# Patient Record
Sex: MALE | Race: BLACK OR AFRICAN AMERICAN | Employment: OTHER | URBAN - METROPOLITAN AREA
[De-identification: names, ages, dates, MRNs, and addresses within clinical notes are randomized per-mention and may not be internally consistent; named-entity substitution may affect disease eponyms.]

---

## 2019-12-06 ENCOUNTER — IP HISTORICAL/CONVERTED ENCOUNTER (OUTPATIENT)
Dept: OTHER | Age: 64
End: 2019-12-06

## 2020-01-01 ENCOUNTER — APPOINTMENT (OUTPATIENT)
Dept: GENERAL RADIOLOGY | Age: 65
DRG: 283 | End: 2020-01-01
Attending: HOSPITALIST
Payer: MEDICARE

## 2020-01-01 ENCOUNTER — APPOINTMENT (OUTPATIENT)
Dept: CT IMAGING | Age: 65
DRG: 283 | End: 2020-01-01
Attending: HOSPITALIST
Payer: MEDICARE

## 2020-01-01 ENCOUNTER — APPOINTMENT (OUTPATIENT)
Dept: NON INVASIVE DIAGNOSTICS | Age: 65
DRG: 283 | End: 2020-01-01
Attending: HOSPITALIST
Payer: MEDICARE

## 2020-01-01 ENCOUNTER — APPOINTMENT (OUTPATIENT)
Dept: GENERAL RADIOLOGY | Age: 65
DRG: 283 | End: 2020-01-01
Attending: STUDENT IN AN ORGANIZED HEALTH CARE EDUCATION/TRAINING PROGRAM
Payer: MEDICARE

## 2020-01-01 ENCOUNTER — APPOINTMENT (OUTPATIENT)
Dept: GENERAL RADIOLOGY | Age: 65
DRG: 283 | End: 2020-01-01
Attending: RADIOLOGY
Payer: MEDICARE

## 2020-01-01 ENCOUNTER — ANESTHESIA EVENT (OUTPATIENT)
Dept: CARDIAC CATH/INVASIVE PROCEDURES | Age: 65
DRG: 283 | End: 2020-01-01
Payer: MEDICARE

## 2020-01-01 ENCOUNTER — APPOINTMENT (OUTPATIENT)
Dept: ULTRASOUND IMAGING | Age: 65
DRG: 283 | End: 2020-01-01
Attending: SURGERY
Payer: MEDICARE

## 2020-01-01 ENCOUNTER — APPOINTMENT (OUTPATIENT)
Dept: INTERVENTIONAL RADIOLOGY/VASCULAR | Age: 65
DRG: 283 | End: 2020-01-01
Attending: INTERNAL MEDICINE
Payer: MEDICARE

## 2020-01-01 ENCOUNTER — ANESTHESIA (OUTPATIENT)
Dept: CARDIAC CATH/INVASIVE PROCEDURES | Age: 65
DRG: 283 | End: 2020-01-01
Payer: MEDICARE

## 2020-01-01 ENCOUNTER — HOSPITAL ENCOUNTER (INPATIENT)
Age: 65
LOS: 5 days | DRG: 283 | End: 2020-12-15
Attending: STUDENT IN AN ORGANIZED HEALTH CARE EDUCATION/TRAINING PROGRAM | Admitting: HOSPITALIST
Payer: MEDICARE

## 2020-01-01 VITALS
WEIGHT: 115.96 LBS | OXYGEN SATURATION: 96 % | DIASTOLIC BLOOD PRESSURE: 76 MMHG | SYSTOLIC BLOOD PRESSURE: 110 MMHG | RESPIRATION RATE: 22 BRPM | HEART RATE: 96 BPM | BODY MASS INDEX: 16.6 KG/M2 | TEMPERATURE: 98.2 F | HEIGHT: 70 IN

## 2020-01-01 DIAGNOSIS — N17.9 ACUTE RENAL FAILURE, UNSPECIFIED ACUTE RENAL FAILURE TYPE (HCC): ICD-10-CM

## 2020-01-01 DIAGNOSIS — I21.4 NSTEMI (NON-ST ELEVATED MYOCARDIAL INFARCTION) (HCC): Primary | ICD-10-CM

## 2020-01-01 DIAGNOSIS — R07.9 CHEST PAIN, UNSPECIFIED TYPE: ICD-10-CM

## 2020-01-01 LAB
ABO + RH BLD: NORMAL
ALBUMIN SERPL-MCNC: 1.7 G/DL (ref 3.5–5)
ALBUMIN SERPL-MCNC: 2 G/DL (ref 3.5–5)
ALBUMIN SERPL-MCNC: 2.2 G/DL (ref 3.5–5)
ALBUMIN SERPL-MCNC: 2.4 G/DL (ref 3.5–5)
ALBUMIN SERPL-MCNC: 2.6 G/DL (ref 3.5–5)
ALBUMIN/GLOB SERPL: 0.4 {RATIO} (ref 1.1–2.2)
ALBUMIN/GLOB SERPL: 0.4 {RATIO} (ref 1.1–2.2)
ALBUMIN/GLOB SERPL: 0.5 {RATIO} (ref 1.1–2.2)
ALBUMIN/GLOB SERPL: 0.5 {RATIO} (ref 1.1–2.2)
ALBUMIN/GLOB SERPL: 0.6 {RATIO} (ref 1.1–2.2)
ALBUMIN/GLOB SERPL: 0.6 {RATIO} (ref 1.1–2.2)
ALP SERPL-CCNC: 104 U/L (ref 45–117)
ALP SERPL-CCNC: 244 U/L (ref 45–117)
ALP SERPL-CCNC: 81 U/L (ref 45–117)
ALP SERPL-CCNC: 93 U/L (ref 45–117)
ALP SERPL-CCNC: 97 U/L (ref 45–117)
ALP SERPL-CCNC: 98 U/L (ref 45–117)
ALT SERPL-CCNC: 334 U/L (ref 12–78)
ALT SERPL-CCNC: 347 U/L (ref 12–78)
ALT SERPL-CCNC: 355 U/L (ref 12–78)
ALT SERPL-CCNC: 382 U/L (ref 12–78)
ALT SERPL-CCNC: 438 U/L (ref 12–78)
ALT SERPL-CCNC: 454 U/L (ref 12–78)
ANION GAP SERPL CALC-SCNC: 11 MMOL/L (ref 5–15)
ANION GAP SERPL CALC-SCNC: 12 MMOL/L (ref 5–15)
ANION GAP SERPL CALC-SCNC: 14 MMOL/L (ref 5–15)
ANION GAP SERPL CALC-SCNC: 14 MMOL/L (ref 5–15)
ANION GAP SERPL CALC-SCNC: 15 MMOL/L (ref 5–15)
ANION GAP SERPL CALC-SCNC: 16 MMOL/L (ref 5–15)
ANION GAP SERPL CALC-SCNC: 9 MMOL/L (ref 5–15)
APPEARANCE UR: ABNORMAL
APTT PPP: 112.6 SEC (ref 23–35.7)
APTT PPP: 144.6 SEC (ref 23–35.7)
APTT PPP: 40.6 SEC (ref 23–35.7)
APTT PPP: 43.8 SEC (ref 23–35.7)
APTT PPP: 45.3 SEC (ref 23–35.7)
APTT PPP: 71.1 SEC (ref 23–35.7)
APTT PPP: 87.4 SEC (ref 23–35.7)
APTT PPP: 94.1 SEC (ref 23–35.7)
APTT PPP: >153 SEC (ref 23–35.7)
ARTERIAL PATENCY WRIST A: POSITIVE
AST SERPL W P-5'-P-CCNC: 1159 U/L (ref 15–37)
AST SERPL W P-5'-P-CCNC: 1166 U/L (ref 15–37)
AST SERPL W P-5'-P-CCNC: 654 U/L (ref 15–37)
AST SERPL W P-5'-P-CCNC: 748 U/L (ref 15–37)
AST SERPL W P-5'-P-CCNC: 789 U/L (ref 15–37)
AST SERPL W P-5'-P-CCNC: 884 U/L (ref 15–37)
ATRIAL RATE: 102 BPM
ATRIAL RATE: 85 BPM
ATRIAL RATE: 89 BPM
ATRIAL RATE: 90 BPM
ATRIAL RATE: 95 BPM
ATRIAL RATE: 99 BPM
BACTERIA URNS QL MICRO: NEGATIVE /HPF
BASE DEFICIT BLDA-SCNC: 16.9 MMOL/L (ref 0–2)
BASOPHILS # BLD: 0 K/UL (ref 0–0.1)
BASOPHILS NFR BLD: 0 % (ref 0–1)
BDY SITE: ABNORMAL
BILIRUB SERPL-MCNC: 0.3 MG/DL (ref 0.2–1)
BILIRUB UR QL: NEGATIVE
BLD PROD TYP BPU: NORMAL
BLOOD GROUP ANTIBODIES SERPL: NEGATIVE
BNP SERPL-MCNC: ABNORMAL PG/ML
BPU ID: NORMAL
BUN SERPL-MCNC: 100 MG/DL (ref 6–20)
BUN SERPL-MCNC: 30 MG/DL (ref 6–20)
BUN SERPL-MCNC: 30 MG/DL (ref 6–20)
BUN SERPL-MCNC: 50 MG/DL (ref 6–20)
BUN SERPL-MCNC: 56 MG/DL (ref 6–20)
BUN SERPL-MCNC: 61 MG/DL (ref 6–20)
BUN SERPL-MCNC: 87 MG/DL (ref 6–20)
BUN/CREAT SERPL: 5 (ref 12–20)
BUN/CREAT SERPL: 5 (ref 12–20)
BUN/CREAT SERPL: 6 (ref 12–20)
BUN/CREAT SERPL: 7 (ref 12–20)
BUN/CREAT SERPL: 7 (ref 12–20)
CA-I BLD-MCNC: 7.9 MG/DL (ref 8.5–10.1)
CA-I BLD-MCNC: 8 MG/DL (ref 8.5–10.1)
CA-I BLD-MCNC: 8.2 MG/DL (ref 8.5–10.1)
CA-I BLD-MCNC: 8.2 MG/DL (ref 8.5–10.1)
CA-I BLD-MCNC: 8.4 MG/DL (ref 8.5–10.1)
CA-I BLD-MCNC: 8.6 MG/DL (ref 8.5–10.1)
CALCULATED P AXIS, ECG09: 57 DEGREES
CALCULATED P AXIS, ECG09: 67 DEGREES
CALCULATED P AXIS, ECG09: 71 DEGREES
CALCULATED P AXIS, ECG09: 75 DEGREES
CALCULATED P AXIS, ECG09: 79 DEGREES
CALCULATED R AXIS, ECG10: -101 DEGREES
CALCULATED R AXIS, ECG10: -105 DEGREES
CALCULATED R AXIS, ECG10: -49 DEGREES
CALCULATED R AXIS, ECG10: -50 DEGREES
CALCULATED R AXIS, ECG10: -85 DEGREES
CALCULATED R AXIS, ECG10: -88 DEGREES
CALCULATED T AXIS, ECG11: 64 DEGREES
CALCULATED T AXIS, ECG11: 68 DEGREES
CALCULATED T AXIS, ECG11: 78 DEGREES
CALCULATED T AXIS, ECG11: 81 DEGREES
CALCULATED T AXIS, ECG11: 87 DEGREES
CALCULATED T AXIS, ECG11: 89 DEGREES
CHLORIDE SERPL-SCNC: 103 MMOL/L (ref 97–108)
CHLORIDE SERPL-SCNC: 104 MMOL/L (ref 97–108)
CHLORIDE SERPL-SCNC: 106 MMOL/L (ref 97–108)
CHLORIDE SERPL-SCNC: 109 MMOL/L (ref 97–108)
CK SERPL-CCNC: NORMAL U/L (ref 39–308)
CO2 SERPL-SCNC: 11 MMOL/L (ref 21–32)
CO2 SERPL-SCNC: 17 MMOL/L (ref 21–32)
CO2 SERPL-SCNC: 20 MMOL/L (ref 21–32)
CO2 SERPL-SCNC: 22 MMOL/L (ref 21–32)
CO2 SERPL-SCNC: 23 MMOL/L (ref 21–32)
CO2 SERPL-SCNC: 24 MMOL/L (ref 21–32)
CO2 SERPL-SCNC: 25 MMOL/L (ref 21–32)
COLOR UR: ABNORMAL
COVID-19 RAPID TEST, COVR: NEGATIVE
CRD SYSTOLIC BP: 88
CREAT SERPL-MCNC: 12.9 MG/DL (ref 0.7–1.3)
CREAT SERPL-MCNC: 13.8 MG/DL (ref 0.7–1.3)
CREAT SERPL-MCNC: 5.82 MG/DL (ref 0.7–1.3)
CREAT SERPL-MCNC: 5.85 MG/DL (ref 0.7–1.3)
CREAT SERPL-MCNC: 8.57 MG/DL (ref 0.7–1.3)
CREAT SERPL-MCNC: 9.42 MG/DL (ref 0.7–1.3)
CREAT SERPL-MCNC: 9.88 MG/DL (ref 0.7–1.3)
CREAT UR-MCNC: 157 MG/DL
CROSSMATCH RESULT,%XM: NORMAL
DIAGNOSIS, 93000: NORMAL
DIFFERENTIAL METHOD BLD: ABNORMAL
ECHO AV AREA PEAK VELOCITY: 2.59 CM2
ECHO AV AREA/BSA PEAK VELOCITY: 1.6 CM2/M2
ECHO AV PEAK GRADIENT: 2 MMHG
ECHO EST RA PRESSURE: 3 MMHG
ECHO LV E' SEPTAL VELOCITY: 6.24 CM/S
ECHO LV EDV A2C: 49.8 CM3
ECHO LV EJECTION FRACTION A2C: 38 %
ECHO LV EJECTION FRACTION BIPLANE: 67.9 % (ref 55–100)
ECHO LV ESV A2C: 12.2 CM3
ECHO LV INTERNAL DIMENSION DIASTOLIC: 3.68 CM (ref 4.2–5.9)
ECHO LV INTERNAL DIMENSION SYSTOLIC: 2.3 CM
ECHO LV IVSD: 0.8 CM (ref 0.6–1)
ECHO LV MASS 2D: 85.8 G (ref 88–224)
ECHO LV MASS INDEX 2D: 51.8 G/M2 (ref 49–115)
ECHO LV POSTERIOR WALL DIASTOLIC: 0.86 CM (ref 0.6–1)
ECHO LVOT DIAM: 1.8 CM
ECHO LVOT PEAK GRADIENT: 2 MMHG
ECHO MV A VELOCITY: 64.2 CM/S
ECHO MV AREA PHT: 3.44 CM2
ECHO MV E DECELERATION TIME (DT): 169 MS
ECHO MV E VELOCITY: 42.6 CM/S
ECHO MV E/A RATIO: 0.66
ECHO MV E/E' SEPTAL: 6.83
ECHO MV PRESSURE HALF TIME (PHT): 64 MS
ECHO PV PEAK INSTANTANEOUS GRADIENT SYSTOLIC: 3 MMHG
ECHO PV PEAK INSTANTANEOUS GRADIENT SYSTOLIC: 9 MMHG
ECHO PV REGURGITANT MAX VELOCITY: 154 CM/S
ECHO PV REGURGITANT MAX VELOCITY: 541 CM/S
ECHO PV REGURGITANT MAX VELOCITY: 68.8 CM/S
ECHO PV REGURGITANT MAX VELOCITY: 70.1 CM/S
ECHO PV REGURGITANT MAX VELOCITY: 93.5 CM/S
ECHO PVEIN A DURATION: 81 MS
ECHO PVEIN A VELOCITY: 22.5 CM/S
ECHO RIGHT VENTRICULAR SYSTOLIC PRESSURE (RVSP): 33 MMHG
ECHO RV INTERNAL DIMENSION: 2.6 CM
ECHO TV MAX VELOCITY: 275 CM/S
ECHO TV REGURGITANT PEAK GRADIENT: 30 MMHG
EOSINOPHIL # BLD: 0 K/UL (ref 0–0.4)
EOSINOPHIL # BLD: 0.1 K/UL (ref 0–0.4)
EOSINOPHIL NFR BLD: 0 % (ref 0–7)
EOSINOPHIL NFR BLD: 1 % (ref 0–7)
ERYTHROCYTE [DISTWIDTH] IN BLOOD BY AUTOMATED COUNT: 13.4 % (ref 11.5–14.5)
ERYTHROCYTE [DISTWIDTH] IN BLOOD BY AUTOMATED COUNT: 13.6 % (ref 11.5–14.5)
ERYTHROCYTE [DISTWIDTH] IN BLOOD BY AUTOMATED COUNT: 14 % (ref 11.5–14.5)
ERYTHROCYTE [DISTWIDTH] IN BLOOD BY AUTOMATED COUNT: 14.2 % (ref 11.5–14.5)
ERYTHROCYTE [DISTWIDTH] IN BLOOD BY AUTOMATED COUNT: 14.4 % (ref 11.5–14.5)
ERYTHROCYTE [DISTWIDTH] IN BLOOD BY AUTOMATED COUNT: 14.7 % (ref 11.5–14.5)
ERYTHROCYTE [DISTWIDTH] IN BLOOD BY AUTOMATED COUNT: 15 % (ref 11.5–14.5)
ERYTHROCYTE [DISTWIDTH] IN BLOOD BY AUTOMATED COUNT: 15 % (ref 11.5–14.5)
FIO2 ON VENT: 100 %
GAS FLOW.O2 O2 DELIVERY SYS: 15 L/MIN
GLOBULIN SER CALC-MCNC: 3.6 G/DL (ref 2–4)
GLOBULIN SER CALC-MCNC: 3.7 G/DL (ref 2–4)
GLOBULIN SER CALC-MCNC: 3.8 G/DL (ref 2–4)
GLOBULIN SER CALC-MCNC: 3.8 G/DL (ref 2–4)
GLOBULIN SER CALC-MCNC: 4.5 G/DL (ref 2–4)
GLOBULIN SER CALC-MCNC: 5.2 G/DL (ref 2–4)
GLUCOSE BLD STRIP.AUTO-MCNC: 85 MG/DL (ref 65–100)
GLUCOSE SERPL-MCNC: 53 MG/DL (ref 65–100)
GLUCOSE SERPL-MCNC: 89 MG/DL (ref 65–100)
GLUCOSE SERPL-MCNC: 94 MG/DL (ref 65–100)
GLUCOSE SERPL-MCNC: 94 MG/DL (ref 65–100)
GLUCOSE SERPL-MCNC: 95 MG/DL (ref 65–100)
GLUCOSE SERPL-MCNC: 95 MG/DL (ref 65–100)
GLUCOSE SERPL-MCNC: 96 MG/DL (ref 65–100)
GLUCOSE UR STRIP.AUTO-MCNC: NEGATIVE MG/DL
HBV CORE AB SERPL QL IA: NEGATIVE
HBV SURFACE AB SER QL: NONREACTIVE
HBV SURFACE AB SER-ACNC: <3.1 MIU/ML
HBV SURFACE AG SER QL: <0.1 INDEX
HBV SURFACE AG SER QL: NEGATIVE
HCO3 BLDA-SCNC: 12 MMOL/L (ref 22–26)
HCT VFR BLD AUTO: 21.1 % (ref 36.6–50.3)
HCT VFR BLD AUTO: 23.2 % (ref 36.6–50.3)
HCT VFR BLD AUTO: 23.6 % (ref 36.6–50.3)
HCT VFR BLD AUTO: 24.8 % (ref 36.6–50.3)
HCT VFR BLD AUTO: 25 % (ref 36.6–50.3)
HCT VFR BLD AUTO: 25.6 % (ref 36.6–50.3)
HCT VFR BLD AUTO: 29.9 % (ref 36.6–50.3)
HCT VFR BLD AUTO: 34.3 % (ref 36.6–50.3)
HCT VFR BLD AUTO: 38.5 % (ref 36.6–50.3)
HGB BLD-MCNC: 10.2 G/DL (ref 12.1–17)
HGB BLD-MCNC: 11.2 G/DL (ref 12.1–17)
HGB BLD-MCNC: 12.7 G/DL (ref 12.1–17)
HGB BLD-MCNC: 7.3 G/DL (ref 12.1–17)
HGB BLD-MCNC: 7.7 G/DL (ref 12.1–17)
HGB BLD-MCNC: 8.1 G/DL (ref 12.1–17)
HGB BLD-MCNC: 8.5 G/DL (ref 12.1–17)
HGB BLD-MCNC: 8.5 G/DL (ref 12.1–17)
HGB BLD-MCNC: 8.7 G/DL (ref 12.1–17)
HGB UR QL STRIP: ABNORMAL
IMM GRANULOCYTES # BLD AUTO: 0 K/UL (ref 0–0.04)
IMM GRANULOCYTES # BLD AUTO: 0.1 K/UL (ref 0–0.04)
IMM GRANULOCYTES NFR BLD AUTO: 0 % (ref 0–0.5)
IMM GRANULOCYTES NFR BLD AUTO: 1 % (ref 0–0.5)
KETONES UR QL STRIP.AUTO: NEGATIVE MG/DL
LACTATE SERPL-SCNC: 0.8 MMOL/L (ref 0.4–2)
LACTATE SERPL-SCNC: 7.1 MMOL/L (ref 0.4–2)
LEUKOCYTE ESTERASE UR QL STRIP.AUTO: NEGATIVE
LIPASE SERPL-CCNC: 469 U/L (ref 73–393)
LYMPHOCYTES # BLD: 0.5 K/UL (ref 0.8–3.5)
LYMPHOCYTES # BLD: 0.7 K/UL (ref 0.8–3.5)
LYMPHOCYTES # BLD: 0.9 K/UL (ref 0.8–3.5)
LYMPHOCYTES # BLD: 0.9 K/UL (ref 0.8–3.5)
LYMPHOCYTES # BLD: 1.1 K/UL (ref 0.8–3.5)
LYMPHOCYTES # BLD: 1.2 K/UL (ref 0.8–3.5)
LYMPHOCYTES NFR BLD: 10 % (ref 12–49)
LYMPHOCYTES NFR BLD: 4 % (ref 12–49)
LYMPHOCYTES NFR BLD: 7 % (ref 12–49)
LYMPHOCYTES NFR BLD: 9 % (ref 12–49)
MAGNESIUM SERPL-MCNC: 2.5 MG/DL (ref 1.6–2.4)
MCH RBC QN AUTO: 32.1 PG (ref 26–34)
MCH RBC QN AUTO: 32.2 PG (ref 26–34)
MCH RBC QN AUTO: 32.3 PG (ref 26–34)
MCH RBC QN AUTO: 32.5 PG (ref 26–34)
MCH RBC QN AUTO: 32.6 PG (ref 26–34)
MCH RBC QN AUTO: 33 PG (ref 26–34)
MCH RBC QN AUTO: 33.5 PG (ref 26–34)
MCH RBC QN AUTO: 33.6 PG (ref 26–34)
MCHC RBC AUTO-ENTMCNC: 32.7 G/DL (ref 30–36.5)
MCHC RBC AUTO-ENTMCNC: 33 G/DL (ref 30–36.5)
MCHC RBC AUTO-ENTMCNC: 33.2 G/DL (ref 30–36.5)
MCHC RBC AUTO-ENTMCNC: 34 G/DL (ref 30–36.5)
MCHC RBC AUTO-ENTMCNC: 34 G/DL (ref 30–36.5)
MCHC RBC AUTO-ENTMCNC: 34.1 G/DL (ref 30–36.5)
MCHC RBC AUTO-ENTMCNC: 34.3 G/DL (ref 30–36.5)
MCHC RBC AUTO-ENTMCNC: 34.3 G/DL (ref 30–36.5)
MCV RBC AUTO: 101.2 FL (ref 80–99)
MCV RBC AUTO: 101.6 FL (ref 80–99)
MCV RBC AUTO: 94.7 FL (ref 80–99)
MCV RBC AUTO: 94.8 FL (ref 80–99)
MCV RBC AUTO: 95 FL (ref 80–99)
MCV RBC AUTO: 95.2 FL (ref 80–99)
MCV RBC AUTO: 96.7 FL (ref 80–99)
MCV RBC AUTO: 98.4 FL (ref 80–99)
MONOCYTES # BLD: 0.4 K/UL (ref 0–1)
MONOCYTES # BLD: 0.5 K/UL (ref 0–1)
MONOCYTES # BLD: 0.6 K/UL (ref 0–1)
MONOCYTES # BLD: 0.7 K/UL (ref 0–1)
MONOCYTES # BLD: 0.8 K/UL (ref 0–1)
MONOCYTES # BLD: 0.9 K/UL (ref 0–1)
MONOCYTES NFR BLD: 5 % (ref 5–13)
MONOCYTES NFR BLD: 6 % (ref 5–13)
MONOCYTES NFR BLD: 7 % (ref 5–13)
MONOCYTES NFR BLD: 7 % (ref 5–13)
MONOCYTES NFR BLD: 8 % (ref 5–13)
MRSA DNA SPEC QL NAA+PROBE: NOT DETECTED
MV DEC SLOPE: 2980 MM/S2
MV DEC SLOPE: 2980 MM/S2
NEUTS SEG # BLD: 10.2 K/UL (ref 1.8–8)
NEUTS SEG # BLD: 10.6 K/UL (ref 1.8–8)
NEUTS SEG # BLD: 10.8 K/UL (ref 1.8–8)
NEUTS SEG # BLD: 10.9 K/UL (ref 1.8–8)
NEUTS SEG # BLD: 11.5 K/UL (ref 1.8–8)
NEUTS SEG # BLD: 6.4 K/UL (ref 1.8–8)
NEUTS SEG # BLD: 7 K/UL (ref 1.8–8)
NEUTS SEG # BLD: 9.4 K/UL (ref 1.8–8)
NEUTS SEG NFR BLD: 81 % (ref 32–75)
NEUTS SEG NFR BLD: 83 % (ref 32–75)
NEUTS SEG NFR BLD: 84 % (ref 32–75)
NEUTS SEG NFR BLD: 85 % (ref 32–75)
NEUTS SEG NFR BLD: 87 % (ref 32–75)
NEUTS SEG NFR BLD: 89 % (ref 32–75)
NITRITE UR QL STRIP.AUTO: NEGATIVE
NRBC # BLD: 0 K/UL (ref 0–0.01)
NRBC # BLD: 0 K/UL (ref 0–0.01)
NRBC BLD-RTO: 0 PER 100 WBC
NRBC BLD-RTO: 0 PER 100 WBC
P-R INTERVAL, ECG05: 140 MS
P-R INTERVAL, ECG05: 144 MS
P-R INTERVAL, ECG05: 146 MS
P-R INTERVAL, ECG05: 148 MS
P-R INTERVAL, ECG05: 196 MS
P-R INTERVAL, ECG05: 210 MS
PCO2 BLDA: 20 MMHG (ref 35–45)
PERFORMED BY, TECHID: NORMAL
PH BLDA: 7.25 [PH] (ref 7.35–7.45)
PH UR STRIP: 6 [PH] (ref 5–8)
PHOSPHATE SERPL-MCNC: 3.4 MG/DL (ref 2.6–4.7)
PHOSPHATE SERPL-MCNC: 9.1 MG/DL (ref 2.6–4.7)
PLATELET # BLD AUTO: 193 K/UL (ref 150–400)
PLATELET # BLD AUTO: 203 K/UL (ref 150–400)
PLATELET # BLD AUTO: 207 K/UL (ref 150–400)
PLATELET # BLD AUTO: 211 K/UL (ref 150–400)
PLATELET # BLD AUTO: 261 K/UL (ref 150–400)
PLATELET # BLD AUTO: 330 K/UL (ref 150–400)
PLATELET # BLD AUTO: 332 K/UL (ref 150–400)
PLATELET # BLD AUTO: 354 K/UL (ref 150–400)
PMV BLD AUTO: 10 FL (ref 8.9–12.9)
PMV BLD AUTO: 10.1 FL (ref 8.9–12.9)
PMV BLD AUTO: 10.3 FL (ref 8.9–12.9)
PMV BLD AUTO: 9.5 FL (ref 8.9–12.9)
PMV BLD AUTO: 9.7 FL (ref 8.9–12.9)
PMV BLD AUTO: 9.8 FL (ref 8.9–12.9)
PMV BLD AUTO: 9.8 FL (ref 8.9–12.9)
PMV BLD AUTO: 9.9 FL (ref 8.9–12.9)
PO2 BLDA: 271 MMHG (ref 75–100)
POTASSIUM SERPL-SCNC: 3.2 MMOL/L (ref 3.5–5.1)
POTASSIUM SERPL-SCNC: 3.3 MMOL/L (ref 3.5–5.1)
POTASSIUM SERPL-SCNC: 3.3 MMOL/L (ref 3.5–5.1)
POTASSIUM SERPL-SCNC: 3.6 MMOL/L (ref 3.5–5.1)
POTASSIUM SERPL-SCNC: 3.6 MMOL/L (ref 3.5–5.1)
POTASSIUM SERPL-SCNC: 4.8 MMOL/L (ref 3.5–5.1)
POTASSIUM SERPL-SCNC: 6.3 MMOL/L (ref 3.5–5.1)
POTASSIUM SERPL-SCNC: 6.4 MMOL/L (ref 3.5–5.1)
PROT SERPL-MCNC: 5.5 G/DL (ref 6.4–8.2)
PROT SERPL-MCNC: 5.5 G/DL (ref 6.4–8.2)
PROT SERPL-MCNC: 5.6 G/DL (ref 6.4–8.2)
PROT SERPL-MCNC: 5.9 G/DL (ref 6.4–8.2)
PROT SERPL-MCNC: 6.9 G/DL (ref 6.4–8.2)
PROT SERPL-MCNC: 7.8 G/DL (ref 6.4–8.2)
PROT UR STRIP-MCNC: 100 MG/DL
PTH-INTACT SERPL-MCNC: 365.8 PG/ML (ref 18.4–88)
Q-T INTERVAL, ECG07: 386 MS
Q-T INTERVAL, ECG07: 408 MS
Q-T INTERVAL, ECG07: 414 MS
Q-T INTERVAL, ECG07: 416 MS
Q-T INTERVAL, ECG07: 422 MS
Q-T INTERVAL, ECG07: 422 MS
QRS DURATION, ECG06: 122 MS
QRS DURATION, ECG06: 128 MS
QRS DURATION, ECG06: 128 MS
QRS DURATION, ECG06: 132 MS
QRS DURATION, ECG06: 132 MS
QRS DURATION, ECG06: 170 MS
QTC CALCULATION (BEZET), ECG08: 495 MS
QTC CALCULATION (BEZET), ECG08: 502 MS
QTC CALCULATION (BEZET), ECG08: 508 MS
QTC CALCULATION (BEZET), ECG08: 513 MS
QTC CALCULATION (BEZET), ECG08: 520 MS
QTC CALCULATION (BEZET), ECG08: 531 MS
RBC # BLD AUTO: 2.4 M/UL (ref 4.1–5.7)
RBC # BLD AUTO: 2.49 M/UL (ref 4.1–5.7)
RBC # BLD AUTO: 2.61 M/UL (ref 4.1–5.7)
RBC # BLD AUTO: 2.64 M/UL (ref 4.1–5.7)
RBC # BLD AUTO: 2.69 M/UL (ref 4.1–5.7)
RBC # BLD AUTO: 3.04 M/UL (ref 4.1–5.7)
RBC # BLD AUTO: 3.39 M/UL (ref 4.1–5.7)
RBC # BLD AUTO: 3.79 M/UL (ref 4.1–5.7)
RBC #/AREA URNS HPF: ABNORMAL /HPF (ref 0–5)
SAO2 % BLD: 100 %
SAO2% DEVICE SAO2% SENSOR NAME: ABNORMAL
SARS-COV-2, COV2: NORMAL
SERVICE CMNT-IMP: ABNORMAL
SODIUM SERPL-SCNC: 136 MMOL/L (ref 136–145)
SODIUM SERPL-SCNC: 137 MMOL/L (ref 136–145)
SODIUM SERPL-SCNC: 138 MMOL/L (ref 136–145)
SODIUM SERPL-SCNC: 139 MMOL/L (ref 136–145)
SODIUM UR-SCNC: 56 MMOL/L
SP GR UR REFRACTOMETRY: 1.02 (ref 1–1.03)
SPECIMEN EXP DATE BLD: NORMAL
SPECIMEN SITE: ABNORMAL
SPECIMEN SOURCE: ABNORMAL
STATUS OF UNIT,%ST: NORMAL
THERAPEUTIC RANGE,PTTT: ABNORMAL SEC (ref 68–109)
TRANSFUSION STATUS PATIENT QL: NORMAL
TROPONIN I SERPL-MCNC: 2.89 NG/ML
TROPONIN I SERPL-MCNC: 2.96 NG/ML
TROPONIN I SERPL-MCNC: 20.2 NG/ML
TROPONIN I SERPL-MCNC: 25.3 NG/ML
TROPONIN I SERPL-MCNC: 59.6 NG/ML
TROPONIN I SERPL-MCNC: 72.4 NG/ML
UNIT DIVISION, %UDIV: 0
URATE SERPL-MCNC: 6.6 MG/DL (ref 3.5–7.2)
UROBILINOGEN UR QL STRIP.AUTO: 0.1 EU/DL (ref 0.1–1)
VENTRICULAR RATE, ECG03: 102 BPM
VENTRICULAR RATE, ECG03: 85 BPM
VENTRICULAR RATE, ECG03: 89 BPM
VENTRICULAR RATE, ECG03: 90 BPM
VENTRICULAR RATE, ECG03: 95 BPM
VENTRICULAR RATE, ECG03: 99 BPM
WBC # BLD AUTO: 11.3 K/UL (ref 4.1–11.1)
WBC # BLD AUTO: 12.1 K/UL (ref 4.1–11.1)
WBC # BLD AUTO: 12.2 K/UL (ref 4.1–11.1)
WBC # BLD AUTO: 12.5 K/UL (ref 4.1–11.1)
WBC # BLD AUTO: 12.8 K/UL (ref 4.1–11.1)
WBC # BLD AUTO: 13.2 K/UL (ref 4.1–11.1)
WBC # BLD AUTO: 7.5 K/UL (ref 4.1–11.1)
WBC # BLD AUTO: 8.4 K/UL (ref 4.1–11.1)
WBC URNS QL MICRO: ABNORMAL /HPF (ref 0–4)

## 2020-01-01 PROCEDURE — 80053 COMPREHEN METABOLIC PANEL: CPT

## 2020-01-01 PROCEDURE — 85025 COMPLETE CBC W/AUTO DIFF WBC: CPT

## 2020-01-01 PROCEDURE — 99285 EMERGENCY DEPT VISIT HI MDM: CPT

## 2020-01-01 PROCEDURE — 74011250637 HC RX REV CODE- 250/637: Performed by: INTERNAL MEDICINE

## 2020-01-01 PROCEDURE — 36415 COLL VENOUS BLD VENIPUNCTURE: CPT

## 2020-01-01 PROCEDURE — 65610000006 HC RM INTENSIVE CARE

## 2020-01-01 PROCEDURE — 84484 ASSAY OF TROPONIN QUANT: CPT

## 2020-01-01 PROCEDURE — 74011000250 HC RX REV CODE- 250: Performed by: NURSE ANESTHETIST, CERTIFIED REGISTERED

## 2020-01-01 PROCEDURE — 74011250636 HC RX REV CODE- 250/636: Performed by: HOSPITALIST

## 2020-01-01 PROCEDURE — 92950 HEART/LUNG RESUSCITATION CPR: CPT | Performed by: INTERNAL MEDICINE

## 2020-01-01 PROCEDURE — 82962 GLUCOSE BLOOD TEST: CPT

## 2020-01-01 PROCEDURE — C1769 GUIDE WIRE: HCPCS | Performed by: INTERNAL MEDICINE

## 2020-01-01 PROCEDURE — 76700 US EXAM ABDOM COMPLETE: CPT

## 2020-01-01 PROCEDURE — 5A1D70Z PERFORMANCE OF URINARY FILTRATION, INTERMITTENT, LESS THAN 6 HOURS PER DAY: ICD-10-PCS | Performed by: INTERNAL MEDICINE

## 2020-01-01 PROCEDURE — 93005 ELECTROCARDIOGRAM TRACING: CPT

## 2020-01-01 PROCEDURE — 83605 ASSAY OF LACTIC ACID: CPT

## 2020-01-01 PROCEDURE — 87340 HEPATITIS B SURFACE AG IA: CPT

## 2020-01-01 PROCEDURE — 74011250636 HC RX REV CODE- 250/636: Performed by: NURSE ANESTHETIST, CERTIFIED REGISTERED

## 2020-01-01 PROCEDURE — 74011250637 HC RX REV CODE- 250/637: Performed by: STUDENT IN AN ORGANIZED HEALTH CARE EDUCATION/TRAINING PROGRAM

## 2020-01-01 PROCEDURE — 77030008814 HC VLV ACC PLUS MRTM -B: Performed by: INTERNAL MEDICINE

## 2020-01-01 PROCEDURE — 93306 TTE W/DOPPLER COMPLETE: CPT

## 2020-01-01 PROCEDURE — B2151ZZ FLUOROSCOPY OF LEFT HEART USING LOW OSMOLAR CONTRAST: ICD-10-PCS | Performed by: INTERNAL MEDICINE

## 2020-01-01 PROCEDURE — 82550 ASSAY OF CK (CPK): CPT

## 2020-01-01 PROCEDURE — 86923 COMPATIBILITY TEST ELECTRIC: CPT

## 2020-01-01 PROCEDURE — 74011250636 HC RX REV CODE- 250/636: Performed by: STUDENT IN AN ORGANIZED HEALTH CARE EDUCATION/TRAINING PROGRAM

## 2020-01-01 PROCEDURE — 83880 ASSAY OF NATRIURETIC PEPTIDE: CPT

## 2020-01-01 PROCEDURE — 71045 X-RAY EXAM CHEST 1 VIEW: CPT

## 2020-01-01 PROCEDURE — 84100 ASSAY OF PHOSPHORUS: CPT

## 2020-01-01 PROCEDURE — 30233N1 TRANSFUSION OF NONAUTOLOGOUS RED BLOOD CELLS INTO PERIPHERAL VEIN, PERCUTANEOUS APPROACH: ICD-10-PCS | Performed by: HOSPITALIST

## 2020-01-01 PROCEDURE — 74011250637 HC RX REV CODE- 250/637: Performed by: HOSPITALIST

## 2020-01-01 PROCEDURE — 87635 SARS-COV-2 COVID-19 AMP PRB: CPT

## 2020-01-01 PROCEDURE — 80069 RENAL FUNCTION PANEL: CPT

## 2020-01-01 PROCEDURE — C1769 GUIDE WIRE: HCPCS

## 2020-01-01 PROCEDURE — C1887 CATHETER, GUIDING: HCPCS | Performed by: INTERNAL MEDICINE

## 2020-01-01 PROCEDURE — 85347 COAGULATION TIME ACTIVATED: CPT

## 2020-01-01 PROCEDURE — 99222 1ST HOSP IP/OBS MODERATE 55: CPT | Performed by: INTERNAL MEDICINE

## 2020-01-01 PROCEDURE — 85730 THROMBOPLASTIN TIME PARTIAL: CPT

## 2020-01-01 PROCEDURE — 93458 L HRT ARTERY/VENTRICLE ANGIO: CPT | Performed by: INTERNAL MEDICINE

## 2020-01-01 PROCEDURE — B2111ZZ FLUOROSCOPY OF MULTIPLE CORONARY ARTERIES USING LOW OSMOLAR CONTRAST: ICD-10-PCS | Performed by: INTERNAL MEDICINE

## 2020-01-01 PROCEDURE — 74011000636 HC RX REV CODE- 636: Performed by: INTERNAL MEDICINE

## 2020-01-01 PROCEDURE — 74011250636 HC RX REV CODE- 250/636: Performed by: INTERNAL MEDICINE

## 2020-01-01 PROCEDURE — 81001 URINALYSIS AUTO W/SCOPE: CPT

## 2020-01-01 PROCEDURE — 99152 MOD SED SAME PHYS/QHP 5/>YRS: CPT | Performed by: INTERNAL MEDICINE

## 2020-01-01 PROCEDURE — 84132 ASSAY OF SERUM POTASSIUM: CPT

## 2020-01-01 PROCEDURE — 74011000258 HC RX REV CODE- 258: Performed by: HOSPITALIST

## 2020-01-01 PROCEDURE — 99153 MOD SED SAME PHYS/QHP EA: CPT | Performed by: INTERNAL MEDICINE

## 2020-01-01 PROCEDURE — 86706 HEP B SURFACE ANTIBODY: CPT

## 2020-01-01 PROCEDURE — 36600 WITHDRAWAL OF ARTERIAL BLOOD: CPT

## 2020-01-01 PROCEDURE — 74011636637 HC RX REV CODE- 636/637: Performed by: STUDENT IN AN ORGANIZED HEALTH CARE EDUCATION/TRAINING PROGRAM

## 2020-01-01 PROCEDURE — 5A1935Z RESPIRATORY VENTILATION, LESS THAN 24 CONSECUTIVE HOURS: ICD-10-PCS | Performed by: NURSE ANESTHETIST, CERTIFIED REGISTERED

## 2020-01-01 PROCEDURE — 77030013516 HC DEV INFL ANGI MRTM -B: Performed by: INTERNAL MEDICINE

## 2020-01-01 PROCEDURE — 87040 BLOOD CULTURE FOR BACTERIA: CPT

## 2020-01-01 PROCEDURE — P9047 ALBUMIN (HUMAN), 25%, 50ML: HCPCS | Performed by: INTERNAL MEDICINE

## 2020-01-01 PROCEDURE — 80307 DRUG TEST PRSMV CHEM ANLYZR: CPT

## 2020-01-01 PROCEDURE — 87641 MR-STAPH DNA AMP PROBE: CPT

## 2020-01-01 PROCEDURE — 93010 ELECTROCARDIOGRAM REPORT: CPT | Performed by: INTERNAL MEDICINE

## 2020-01-01 PROCEDURE — 77030004550 HC CATH ANGI DX PRF MRTM -B: Performed by: INTERNAL MEDICINE

## 2020-01-01 PROCEDURE — 83735 ASSAY OF MAGNESIUM: CPT

## 2020-01-01 PROCEDURE — 96366 THER/PROPH/DIAG IV INF ADDON: CPT

## 2020-01-01 PROCEDURE — 86704 HEP B CORE ANTIBODY TOTAL: CPT

## 2020-01-01 PROCEDURE — 85018 HEMOGLOBIN: CPT

## 2020-01-01 PROCEDURE — 96375 TX/PRO/DX INJ NEW DRUG ADDON: CPT

## 2020-01-01 PROCEDURE — 96365 THER/PROPH/DIAG IV INF INIT: CPT

## 2020-01-01 PROCEDURE — 0BH17EZ INSERTION OF ENDOTRACHEAL AIRWAY INTO TRACHEA, VIA NATURAL OR ARTIFICIAL OPENING: ICD-10-PCS | Performed by: NURSE ANESTHETIST, CERTIFIED REGISTERED

## 2020-01-01 PROCEDURE — 74011000258 HC RX REV CODE- 258: Performed by: INTERNAL MEDICINE

## 2020-01-01 PROCEDURE — C1894 INTRO/SHEATH, NON-LASER: HCPCS | Performed by: INTERNAL MEDICINE

## 2020-01-01 PROCEDURE — 02H633Z INSERTION OF INFUSION DEVICE INTO RIGHT ATRIUM, PERCUTANEOUS APPROACH: ICD-10-PCS | Performed by: RADIOLOGY

## 2020-01-01 PROCEDURE — 76937 US GUIDE VASCULAR ACCESS: CPT

## 2020-01-01 PROCEDURE — 90935 HEMODIALYSIS ONE EVALUATION: CPT

## 2020-01-01 PROCEDURE — 83690 ASSAY OF LIPASE: CPT

## 2020-01-01 PROCEDURE — 4A023N7 MEASUREMENT OF CARDIAC SAMPLING AND PRESSURE, LEFT HEART, PERCUTANEOUS APPROACH: ICD-10-PCS | Performed by: INTERNAL MEDICINE

## 2020-01-01 PROCEDURE — P9016 RBC LEUKOCYTES REDUCED: HCPCS

## 2020-01-01 PROCEDURE — 77030025703 HC SYR ANGI VACLOK MRTM -A: Performed by: INTERNAL MEDICINE

## 2020-01-01 PROCEDURE — 36430 TRANSFUSION BLD/BLD COMPNT: CPT

## 2020-01-01 PROCEDURE — 83970 ASSAY OF PARATHORMONE: CPT

## 2020-01-01 PROCEDURE — 86900 BLOOD TYPING SEROLOGIC ABO: CPT

## 2020-01-01 PROCEDURE — 85014 HEMATOCRIT: CPT

## 2020-01-01 PROCEDURE — 82570 ASSAY OF URINE CREATININE: CPT

## 2020-01-01 PROCEDURE — 82803 BLOOD GASES ANY COMBINATION: CPT

## 2020-01-01 PROCEDURE — 84550 ASSAY OF BLOOD/URIC ACID: CPT

## 2020-01-01 PROCEDURE — 74011000250 HC RX REV CODE- 250: Performed by: INTERNAL MEDICINE

## 2020-01-01 PROCEDURE — 84300 ASSAY OF URINE SODIUM: CPT

## 2020-01-01 PROCEDURE — 77030008542 HC TBNG MON PRSS EDWD -A: Performed by: INTERNAL MEDICINE

## 2020-01-01 PROCEDURE — 74011000250 HC RX REV CODE- 250: Performed by: STUDENT IN AN ORGANIZED HEALTH CARE EDUCATION/TRAINING PROGRAM

## 2020-01-01 PROCEDURE — 74176 CT ABD & PELVIS W/O CONTRAST: CPT

## 2020-01-01 RX ORDER — METOPROLOL TARTRATE 25 MG/1
12.5 TABLET, FILM COATED ORAL 2 TIMES DAILY
COMMUNITY

## 2020-01-01 RX ORDER — MAG HYDROX/ALUMINUM HYD/SIMETH 200-200-20
30 SUSPENSION, ORAL (FINAL DOSE FORM) ORAL ONCE
Status: DISCONTINUED | OUTPATIENT
Start: 2020-01-01 | End: 2020-01-01

## 2020-01-01 RX ORDER — DEXTROSE 50 % IN WATER (D50W) INTRAVENOUS SYRINGE
25
Status: COMPLETED | OUTPATIENT
Start: 2020-01-01 | End: 2020-01-01

## 2020-01-01 RX ORDER — ACETAMINOPHEN 500 MG
TABLET ORAL
COMMUNITY

## 2020-01-01 RX ORDER — SODIUM CHLORIDE 9 MG/ML
250 INJECTION, SOLUTION INTRAVENOUS AS NEEDED
Status: DISCONTINUED | OUTPATIENT
Start: 2020-01-01 | End: 2020-01-01 | Stop reason: HOSPADM

## 2020-01-01 RX ORDER — OMEPRAZOLE 20 MG/1
20 CAPSULE, DELAYED RELEASE ORAL DAILY
COMMUNITY

## 2020-01-01 RX ORDER — SODIUM BICARBONATE 650 MG/1
1300 TABLET ORAL 3 TIMES DAILY
Status: DISCONTINUED | OUTPATIENT
Start: 2020-01-01 | End: 2020-01-01

## 2020-01-01 RX ORDER — LIDOCAINE HYDROCHLORIDE 20 MG/ML
15 SOLUTION OROPHARYNGEAL
Status: DISCONTINUED | OUTPATIENT
Start: 2020-01-01 | End: 2020-01-01

## 2020-01-01 RX ORDER — SODIUM CHLORIDE 9 MG/ML
50 INJECTION, SOLUTION INTRAVENOUS CONTINUOUS
Status: DISCONTINUED | OUTPATIENT
Start: 2020-01-01 | End: 2020-01-01 | Stop reason: HOSPADM

## 2020-01-01 RX ORDER — LIDOCAINE HYDROCHLORIDE 10 MG/ML
INJECTION INFILTRATION; PERINEURAL AS NEEDED
Status: DISCONTINUED | OUTPATIENT
Start: 2020-01-01 | End: 2020-01-01 | Stop reason: HOSPADM

## 2020-01-01 RX ORDER — ASPIRIN 81 MG/1
81 TABLET ORAL DAILY
Status: DISCONTINUED | OUTPATIENT
Start: 2020-01-01 | End: 2020-01-01 | Stop reason: HOSPADM

## 2020-01-01 RX ORDER — IBUPROFEN 200 MG
1 TABLET ORAL DAILY
Status: DISCONTINUED | OUTPATIENT
Start: 2020-01-01 | End: 2020-01-01 | Stop reason: HOSPADM

## 2020-01-01 RX ORDER — ASPIRIN 81 MG/1
TABLET ORAL DAILY
COMMUNITY

## 2020-01-01 RX ORDER — HEPARIN SODIUM 1000 [USP'U]/ML
2500 INJECTION, SOLUTION INTRAVENOUS; SUBCUTANEOUS AS NEEDED
Status: DISCONTINUED | OUTPATIENT
Start: 2020-01-01 | End: 2020-01-01 | Stop reason: HOSPADM

## 2020-01-01 RX ORDER — HEPARIN SODIUM 1000 [USP'U]/ML
INJECTION, SOLUTION INTRAVENOUS; SUBCUTANEOUS AS NEEDED
Status: DISCONTINUED | OUTPATIENT
Start: 2020-01-01 | End: 2020-01-01 | Stop reason: HOSPADM

## 2020-01-01 RX ORDER — POTASSIUM CHLORIDE 20 MEQ/1
40 TABLET, EXTENDED RELEASE ORAL
Status: COMPLETED | OUTPATIENT
Start: 2020-01-01 | End: 2020-01-01

## 2020-01-01 RX ORDER — LOSARTAN POTASSIUM 25 MG/1
TABLET ORAL DAILY
COMMUNITY

## 2020-01-01 RX ORDER — HEPARIN SODIUM 200 [USP'U]/100ML
INJECTION, SOLUTION INTRAVENOUS
Status: COMPLETED | OUTPATIENT
Start: 2020-01-01 | End: 2020-01-01

## 2020-01-01 RX ORDER — ISOSORBIDE MONONITRATE 30 MG/1
TABLET, EXTENDED RELEASE ORAL DAILY
COMMUNITY

## 2020-01-01 RX ORDER — LORATADINE 10 MG/1
10 TABLET ORAL
COMMUNITY

## 2020-01-01 RX ORDER — VASOPRESSIN 20 U/ML
INJECTION PARENTERAL AS NEEDED
Status: DISCONTINUED | OUTPATIENT
Start: 2020-01-01 | End: 2020-01-01 | Stop reason: HOSPADM

## 2020-01-01 RX ORDER — SODIUM CHLORIDE 0.9 % (FLUSH) 0.9 %
5-40 SYRINGE (ML) INJECTION AS NEEDED
Status: DISCONTINUED | OUTPATIENT
Start: 2020-01-01 | End: 2020-01-01 | Stop reason: HOSPADM

## 2020-01-01 RX ORDER — SODIUM BICARBONATE 1 MEQ/ML
50 SYRINGE (ML) INTRAVENOUS ONCE
Status: COMPLETED | OUTPATIENT
Start: 2020-01-01 | End: 2020-01-01

## 2020-01-01 RX ORDER — SEVELAMER CARBONATE 800 MG/1
1600 TABLET, FILM COATED ORAL
Status: DISCONTINUED | OUTPATIENT
Start: 2020-01-01 | End: 2020-01-01 | Stop reason: HOSPADM

## 2020-01-01 RX ORDER — SODIUM POLYSTYRENE SULFONATE 15 G/60ML
15 SUSPENSION ORAL; RECTAL
Status: COMPLETED | OUTPATIENT
Start: 2020-01-01 | End: 2020-01-01

## 2020-01-01 RX ORDER — SODIUM CHLORIDE 0.9 % (FLUSH) 0.9 %
5-40 SYRINGE (ML) INJECTION EVERY 8 HOURS
Status: DISCONTINUED | OUTPATIENT
Start: 2020-01-01 | End: 2020-01-01 | Stop reason: HOSPADM

## 2020-01-01 RX ORDER — MIDAZOLAM HYDROCHLORIDE 1 MG/ML
INJECTION, SOLUTION INTRAMUSCULAR; INTRAVENOUS AS NEEDED
Status: DISCONTINUED | OUTPATIENT
Start: 2020-01-01 | End: 2020-01-01 | Stop reason: HOSPADM

## 2020-01-01 RX ORDER — ALBUMIN HUMAN 250 G/1000ML
25 SOLUTION INTRAVENOUS ONCE
Status: COMPLETED | OUTPATIENT
Start: 2020-01-01 | End: 2020-01-01

## 2020-01-01 RX ORDER — HEPARIN SODIUM 5000 [USP'U]/ML
60 INJECTION, SOLUTION INTRAVENOUS; SUBCUTANEOUS ONCE
Status: COMPLETED | OUTPATIENT
Start: 2020-01-01 | End: 2020-01-01

## 2020-01-01 RX ORDER — ONDANSETRON 2 MG/ML
4 INJECTION INTRAMUSCULAR; INTRAVENOUS
Status: DISCONTINUED | OUTPATIENT
Start: 2020-01-01 | End: 2020-01-01 | Stop reason: HOSPADM

## 2020-01-01 RX ORDER — ATORVASTATIN CALCIUM 40 MG/1
40 TABLET, FILM COATED ORAL
Status: DISCONTINUED | OUTPATIENT
Start: 2020-01-01 | End: 2020-01-01 | Stop reason: HOSPADM

## 2020-01-01 RX ORDER — ALBUTEROL SULFATE 2.5 MG/.5ML
1.25 SOLUTION RESPIRATORY (INHALATION)
Status: DISCONTINUED | OUTPATIENT
Start: 2020-01-01 | End: 2020-01-01 | Stop reason: HOSPADM

## 2020-01-01 RX ORDER — DIPHENHYDRAMINE HYDROCHLORIDE 50 MG/ML
INJECTION, SOLUTION INTRAMUSCULAR; INTRAVENOUS AS NEEDED
Status: DISCONTINUED | OUTPATIENT
Start: 2020-01-01 | End: 2020-01-01 | Stop reason: HOSPADM

## 2020-01-01 RX ORDER — ASPIRIN 325 MG/1
100 TABLET, FILM COATED ORAL DAILY
Status: DISCONTINUED | OUTPATIENT
Start: 2020-01-01 | End: 2020-01-01 | Stop reason: HOSPADM

## 2020-01-01 RX ORDER — ETOMIDATE 2 MG/ML
INJECTION INTRAVENOUS AS NEEDED
Status: DISCONTINUED | OUTPATIENT
Start: 2020-01-01 | End: 2020-01-01 | Stop reason: HOSPADM

## 2020-01-01 RX ORDER — ACETAMINOPHEN 650 MG/1
650 SUPPOSITORY RECTAL
Status: DISCONTINUED | OUTPATIENT
Start: 2020-01-01 | End: 2020-01-01

## 2020-01-01 RX ORDER — ROSUVASTATIN CALCIUM 40 MG/1
40 TABLET, COATED ORAL
COMMUNITY

## 2020-01-01 RX ORDER — HEPARIN SODIUM 1000 [USP'U]/ML
30 INJECTION, SOLUTION INTRAVENOUS; SUBCUTANEOUS AS NEEDED
Status: DISCONTINUED | OUTPATIENT
Start: 2020-01-01 | End: 2020-01-01 | Stop reason: HOSPADM

## 2020-01-01 RX ORDER — METOPROLOL TARTRATE 25 MG/1
25 TABLET, FILM COATED ORAL 2 TIMES DAILY
Status: DISCONTINUED | OUTPATIENT
Start: 2020-01-01 | End: 2020-01-01 | Stop reason: HOSPADM

## 2020-01-01 RX ORDER — CLOPIDOGREL 300 MG/1
300 TABLET, FILM COATED ORAL ONCE
Status: COMPLETED | OUTPATIENT
Start: 2020-01-01 | End: 2020-01-01

## 2020-01-01 RX ORDER — DOPAMINE HYDROCHLORIDE 160 MG/100ML
INJECTION, SOLUTION INTRAVENOUS
Status: COMPLETED | OUTPATIENT
Start: 2020-01-01 | End: 2020-01-01

## 2020-01-01 RX ORDER — NITROGLYCERIN 0.4 MG/1
0.4 TABLET SUBLINGUAL
COMMUNITY

## 2020-01-01 RX ORDER — ACETAMINOPHEN 325 MG/1
650 TABLET ORAL
Status: DISCONTINUED | OUTPATIENT
Start: 2020-01-01 | End: 2020-01-01

## 2020-01-01 RX ORDER — POLYETHYLENE GLYCOL 3350 17 G/17G
17 POWDER, FOR SOLUTION ORAL DAILY PRN
Status: DISCONTINUED | OUTPATIENT
Start: 2020-01-01 | End: 2020-01-01 | Stop reason: HOSPADM

## 2020-01-01 RX ORDER — GUAIFENESIN 100 MG/5ML
325 LIQUID (ML) ORAL
Status: COMPLETED | OUTPATIENT
Start: 2020-01-01 | End: 2020-01-01

## 2020-01-01 RX ORDER — CLOPIDOGREL BISULFATE 75 MG/1
TABLET ORAL
COMMUNITY

## 2020-01-01 RX ORDER — FOLIC ACID 1 MG/1
1 TABLET ORAL DAILY
Status: DISCONTINUED | OUTPATIENT
Start: 2020-01-01 | End: 2020-01-01 | Stop reason: HOSPADM

## 2020-01-01 RX ORDER — SUCCINYLCHOLINE CHLORIDE 20 MG/ML
INJECTION INTRAMUSCULAR; INTRAVENOUS AS NEEDED
Status: DISCONTINUED | OUTPATIENT
Start: 2020-01-01 | End: 2020-01-01 | Stop reason: HOSPADM

## 2020-01-01 RX ORDER — NITROGLYCERIN 0.4 MG/1
0.4 TABLET SUBLINGUAL AS NEEDED
Status: DISCONTINUED | OUTPATIENT
Start: 2020-01-01 | End: 2020-01-01 | Stop reason: HOSPADM

## 2020-01-01 RX ORDER — FENTANYL CITRATE 50 UG/ML
INJECTION, SOLUTION INTRAMUSCULAR; INTRAVENOUS AS NEEDED
Status: DISCONTINUED | OUTPATIENT
Start: 2020-01-01 | End: 2020-01-01 | Stop reason: HOSPADM

## 2020-01-01 RX ORDER — CLOPIDOGREL BISULFATE 75 MG/1
75 TABLET ORAL DAILY
Status: DISCONTINUED | OUTPATIENT
Start: 2020-01-01 | End: 2020-01-01 | Stop reason: HOSPADM

## 2020-01-01 RX ORDER — HEPARIN SODIUM 10000 [USP'U]/100ML
12-25 INJECTION, SOLUTION INTRAVENOUS
Status: DISCONTINUED | OUTPATIENT
Start: 2020-01-01 | End: 2020-01-01 | Stop reason: HOSPADM

## 2020-01-01 RX ORDER — HEPARIN SODIUM 1000 [USP'U]/ML
60 INJECTION, SOLUTION INTRAVENOUS; SUBCUTANEOUS AS NEEDED
Status: DISCONTINUED | OUTPATIENT
Start: 2020-01-01 | End: 2020-01-01 | Stop reason: HOSPADM

## 2020-01-01 RX ORDER — PROMETHAZINE HYDROCHLORIDE 25 MG/1
12.5 TABLET ORAL
Status: DISCONTINUED | OUTPATIENT
Start: 2020-01-01 | End: 2020-01-01 | Stop reason: HOSPADM

## 2020-01-01 RX ORDER — BISMUTH SUBSALICYLATE 262 MG
1 TABLET,CHEWABLE ORAL DAILY
Status: DISCONTINUED | OUTPATIENT
Start: 2020-01-01 | End: 2020-01-01 | Stop reason: HOSPADM

## 2020-01-01 RX ORDER — PANTOPRAZOLE SODIUM 40 MG/1
40 TABLET, DELAYED RELEASE ORAL DAILY
Status: DISCONTINUED | OUTPATIENT
Start: 2020-01-01 | End: 2020-01-01 | Stop reason: HOSPADM

## 2020-01-01 RX ORDER — POTASSIUM CHLORIDE 20 MEQ/1
40 TABLET, EXTENDED RELEASE ORAL
Status: DISCONTINUED | OUTPATIENT
Start: 2020-01-01 | End: 2020-01-01 | Stop reason: HOSPADM

## 2020-01-01 RX ADMIN — LIDOCAINE HYDROCHLORIDE 30 ML: 20 SOLUTION ORAL; TOPICAL at 18:05

## 2020-01-01 RX ADMIN — CLOPIDOGREL BISULFATE 300 MG: 300 TABLET, FILM COATED ORAL at 14:31

## 2020-01-01 RX ADMIN — PANTOPRAZOLE SODIUM 40 MG: 40 TABLET, DELAYED RELEASE ORAL at 09:03

## 2020-01-01 RX ADMIN — Medication 10 ML: at 18:07

## 2020-01-01 RX ADMIN — ONDANSETRON 4 MG: 2 INJECTION INTRAMUSCULAR; INTRAVENOUS at 08:05

## 2020-01-01 RX ADMIN — PIPERACILLIN AND TAZOBACTAM 3.38 G: 3; .375 INJECTION, POWDER, LYOPHILIZED, FOR SOLUTION INTRAVENOUS at 08:35

## 2020-01-01 RX ADMIN — HEPARIN SODIUM AND DEXTROSE 14 UNITS/KG/HR: 10000; 5 INJECTION INTRAVENOUS at 23:43

## 2020-01-01 RX ADMIN — ASPIRIN 81 MG: 81 TABLET, COATED ORAL at 08:06

## 2020-01-01 RX ADMIN — METOPROLOL TARTRATE 25 MG: 25 TABLET, FILM COATED ORAL at 08:06

## 2020-01-01 RX ADMIN — Medication 20 ML: at 06:00

## 2020-01-01 RX ADMIN — ASPIRIN 81 MG: 81 TABLET, COATED ORAL at 08:22

## 2020-01-01 RX ADMIN — Medication 10 ML: at 14:42

## 2020-01-01 RX ADMIN — ACETAMINOPHEN 650 MG: 325 TABLET, FILM COATED ORAL at 09:03

## 2020-01-01 RX ADMIN — SEVELAMER CARBONATE 1600 MG: 800 TABLET, FILM COATED ORAL at 11:58

## 2020-01-01 RX ADMIN — POTASSIUM CHLORIDE 40 MEQ: 1500 TABLET, EXTENDED RELEASE ORAL at 08:06

## 2020-01-01 RX ADMIN — METOPROLOL TARTRATE 25 MG: 25 TABLET, FILM COATED ORAL at 22:16

## 2020-01-01 RX ADMIN — METOPROLOL TARTRATE 25 MG: 25 TABLET, FILM COATED ORAL at 23:17

## 2020-01-01 RX ADMIN — MULTIVITAMIN TABLET 1 TABLET: TABLET at 08:24

## 2020-01-01 RX ADMIN — SODIUM BICARBONATE 50 MEQ: 84 INJECTION INTRAVENOUS at 20:44

## 2020-01-01 RX ADMIN — METOPROLOL TARTRATE 25 MG: 25 TABLET, FILM COATED ORAL at 20:44

## 2020-01-01 RX ADMIN — VASOPRESSIN 1 UNITS: 20 INJECTION INTRAVENOUS at 10:28

## 2020-01-01 RX ADMIN — CLOPIDOGREL BISULFATE 75 MG: 75 TABLET ORAL at 09:03

## 2020-01-01 RX ADMIN — POTASSIUM CHLORIDE 40 MEQ: 1500 TABLET, EXTENDED RELEASE ORAL at 08:40

## 2020-01-01 RX ADMIN — HEPARIN SODIUM 3400 UNITS: 5000 INJECTION INTRAVENOUS; SUBCUTANEOUS at 20:44

## 2020-01-01 RX ADMIN — ATORVASTATIN CALCIUM 40 MG: 40 TABLET, FILM COATED ORAL at 22:16

## 2020-01-01 RX ADMIN — FOLIC ACID 1 MG: 1 TABLET ORAL at 09:03

## 2020-01-01 RX ADMIN — HEPARIN SODIUM 3400 UNITS: 1000 INJECTION, SOLUTION INTRAVENOUS; SUBCUTANEOUS at 14:00

## 2020-01-01 RX ADMIN — PIPERACILLIN AND TAZOBACTAM 3.38 G: 3; .375 INJECTION, POWDER, LYOPHILIZED, FOR SOLUTION INTRAVENOUS at 08:05

## 2020-01-01 RX ADMIN — PIPERACILLIN AND TAZOBACTAM 3.38 G: 3; .375 INJECTION, POWDER, LYOPHILIZED, FOR SOLUTION INTRAVENOUS at 09:09

## 2020-01-01 RX ADMIN — TICAGRELOR 180 MG: 90 TABLET ORAL at 20:31

## 2020-01-01 RX ADMIN — SODIUM CHLORIDE 50 ML/HR: 9 INJECTION, SOLUTION INTRAVENOUS at 12:08

## 2020-01-01 RX ADMIN — PIPERACILLIN AND TAZOBACTAM 3.38 G: 3; .375 INJECTION, POWDER, LYOPHILIZED, FOR SOLUTION INTRAVENOUS at 08:28

## 2020-01-01 RX ADMIN — HEPARIN SODIUM 2500 UNITS: 1000 INJECTION, SOLUTION INTRAVENOUS; SUBCUTANEOUS at 17:29

## 2020-01-01 RX ADMIN — THIAMINE HCL TAB 100 MG 100 MG: 100 TAB at 08:06

## 2020-01-01 RX ADMIN — HEPARIN SODIUM 2500 UNITS: 1000 INJECTION, SOLUTION INTRAVENOUS; SUBCUTANEOUS at 13:46

## 2020-01-01 RX ADMIN — Medication 10 ML: at 22:25

## 2020-01-01 RX ADMIN — PANTOPRAZOLE SODIUM 40 MG: 40 TABLET, DELAYED RELEASE ORAL at 08:22

## 2020-01-01 RX ADMIN — MULTIVITAMIN TABLET 1 TABLET: TABLET at 08:29

## 2020-01-01 RX ADMIN — SODIUM BICARBONATE 650 MG TABLET 1300 MG: at 16:56

## 2020-01-01 RX ADMIN — SEVELAMER CARBONATE 1600 MG: 800 TABLET, FILM COATED ORAL at 16:56

## 2020-01-01 RX ADMIN — ALBUMIN (HUMAN) 25 G: 0.25 INJECTION, SOLUTION INTRAVENOUS at 13:48

## 2020-01-01 RX ADMIN — CLOPIDOGREL BISULFATE 75 MG: 75 TABLET ORAL at 08:22

## 2020-01-01 RX ADMIN — THIAMINE HCL TAB 100 MG 100 MG: 100 TAB at 09:09

## 2020-01-01 RX ADMIN — SEVELAMER CARBONATE 1600 MG: 800 TABLET, FILM COATED ORAL at 08:32

## 2020-01-01 RX ADMIN — ASPIRIN 81 MG: 81 TABLET, COATED ORAL at 09:03

## 2020-01-01 RX ADMIN — PHENYLEPHRINE HYDROCHLORIDE 200 MCG: 10 INJECTION INTRAVENOUS at 10:25

## 2020-01-01 RX ADMIN — Medication 10 ML: at 14:32

## 2020-01-01 RX ADMIN — SEVELAMER CARBONATE 1600 MG: 800 TABLET, FILM COATED ORAL at 12:07

## 2020-01-01 RX ADMIN — ATORVASTATIN CALCIUM 40 MG: 40 TABLET, FILM COATED ORAL at 22:00

## 2020-01-01 RX ADMIN — SEVELAMER CARBONATE 1600 MG: 800 TABLET, FILM COATED ORAL at 18:03

## 2020-01-01 RX ADMIN — FOLIC ACID 1 MG: 1 TABLET ORAL at 08:06

## 2020-01-01 RX ADMIN — HEPARIN SODIUM AND DEXTROSE 14 UNITS/KG/HR: 10000; 5 INJECTION INTRAVENOUS at 21:05

## 2020-01-01 RX ADMIN — CLOPIDOGREL BISULFATE 75 MG: 75 TABLET ORAL at 08:06

## 2020-01-01 RX ADMIN — PIPERACILLIN AND TAZOBACTAM 3.38 G: 3; .375 INJECTION, POWDER, LYOPHILIZED, FOR SOLUTION INTRAVENOUS at 22:04

## 2020-01-01 RX ADMIN — NITROGLYCERIN 0.4 MG: 0.4 TABLET, ORALLY DISINTEGRATING SUBLINGUAL at 20:52

## 2020-01-01 RX ADMIN — ETOMIDATE 10 MG: 2 INJECTION INTRAVENOUS at 10:19

## 2020-01-01 RX ADMIN — THIAMINE HCL TAB 100 MG 100 MG: 100 TAB at 08:32

## 2020-01-01 RX ADMIN — THIAMINE HCL TAB 100 MG 100 MG: 100 TAB at 09:03

## 2020-01-01 RX ADMIN — SODIUM BICARBONATE 650 MG TABLET 1300 MG: at 22:17

## 2020-01-01 RX ADMIN — Medication 10 ML: at 22:00

## 2020-01-01 RX ADMIN — HEPARIN SODIUM AND DEXTROSE 12 UNITS/KG/HR: 10000; 5 INJECTION INTRAVENOUS at 14:00

## 2020-01-01 RX ADMIN — FOLIC ACID 1 MG: 1 TABLET ORAL at 08:29

## 2020-01-01 RX ADMIN — VASOPRESSIN 1 UNITS: 20 INJECTION INTRAVENOUS at 10:32

## 2020-01-01 RX ADMIN — Medication 10 ML: at 09:09

## 2020-01-01 RX ADMIN — PIPERACILLIN AND TAZOBACTAM 3.38 G: 3; .375 INJECTION, POWDER, LYOPHILIZED, FOR SOLUTION INTRAVENOUS at 20:44

## 2020-01-01 RX ADMIN — SEVELAMER CARBONATE 1600 MG: 800 TABLET, FILM COATED ORAL at 17:33

## 2020-01-01 RX ADMIN — SODIUM BICARBONATE 650 MG TABLET 1300 MG: at 17:49

## 2020-01-01 RX ADMIN — MULTIVITAMIN TABLET 1 TABLET: TABLET at 09:09

## 2020-01-01 RX ADMIN — DEXTROSE MONOHYDRATE 25 G: 25 INJECTION, SOLUTION INTRAVENOUS at 20:37

## 2020-01-01 RX ADMIN — METOPROLOL TARTRATE 25 MG: 25 TABLET, FILM COATED ORAL at 22:04

## 2020-01-01 RX ADMIN — SUCCINYLCHOLINE CHLORIDE 60 MG: 20 INJECTION, SOLUTION INTRAMUSCULAR; INTRAVENOUS at 10:20

## 2020-01-01 RX ADMIN — SODIUM BICARBONATE 50 MEQ: 84 INJECTION INTRAVENOUS at 20:37

## 2020-01-01 RX ADMIN — SODIUM BICARBONATE 650 MG TABLET 1300 MG: at 18:03

## 2020-01-01 RX ADMIN — PIPERACILLIN AND TAZOBACTAM 3.38 G: 3; .375 INJECTION, POWDER, LYOPHILIZED, FOR SOLUTION INTRAVENOUS at 20:52

## 2020-01-01 RX ADMIN — SEVELAMER CARBONATE 1600 MG: 800 TABLET, FILM COATED ORAL at 12:20

## 2020-01-01 RX ADMIN — SODIUM CHLORIDE 125 ML/HR: 9 INJECTION, SOLUTION INTRAVENOUS at 01:41

## 2020-01-01 RX ADMIN — SODIUM BICARBONATE 650 MG TABLET 1300 MG: at 08:32

## 2020-01-01 RX ADMIN — FAMOTIDINE 20 MG: 10 INJECTION INTRAVENOUS at 18:56

## 2020-01-01 RX ADMIN — SODIUM BICARBONATE 650 MG TABLET 1300 MG: at 22:00

## 2020-01-01 RX ADMIN — SODIUM BICARBONATE 650 MG TABLET 1300 MG: at 08:29

## 2020-01-01 RX ADMIN — SODIUM POLYSTYRENE SULFONATE 15 G: 15 SUSPENSION ORAL; RECTAL at 21:08

## 2020-01-01 RX ADMIN — HEPARIN SODIUM AND DEXTROSE 12 UNITS/KG/HR: 10000; 5 INJECTION INTRAVENOUS at 21:00

## 2020-01-01 RX ADMIN — PANTOPRAZOLE SODIUM 40 MG: 40 TABLET, DELAYED RELEASE ORAL at 09:09

## 2020-01-01 RX ADMIN — Medication 10 ML: at 22:17

## 2020-01-01 RX ADMIN — PANTOPRAZOLE SODIUM 40 MG: 40 TABLET, DELAYED RELEASE ORAL at 08:06

## 2020-01-01 RX ADMIN — MULTIVITAMIN TABLET 1 TABLET: TABLET at 08:06

## 2020-01-01 RX ADMIN — FOLIC ACID 1 MG: 1 TABLET ORAL at 09:09

## 2020-01-01 RX ADMIN — ASPIRIN 81 MG: 81 TABLET, COATED ORAL at 09:09

## 2020-01-01 RX ADMIN — ATORVASTATIN CALCIUM 40 MG: 40 TABLET, FILM COATED ORAL at 20:45

## 2020-01-01 RX ADMIN — Medication 5 ML: at 22:00

## 2020-01-01 RX ADMIN — ASPIRIN 81 MG CHEWABLE TABLET 324 MG: 81 TABLET CHEWABLE at 20:31

## 2020-01-01 RX ADMIN — METOPROLOL TARTRATE 25 MG: 25 TABLET, FILM COATED ORAL at 08:22

## 2020-01-01 RX ADMIN — SEVELAMER CARBONATE 1600 MG: 800 TABLET, FILM COATED ORAL at 08:06

## 2020-01-01 RX ADMIN — SODIUM BICARBONATE 650 MG TABLET 1300 MG: at 09:09

## 2020-01-01 RX ADMIN — SEVELAMER CARBONATE 1600 MG: 800 TABLET, FILM COATED ORAL at 17:51

## 2020-01-01 RX ADMIN — SODIUM BICARBONATE 650 MG TABLET 1300 MG: at 22:04

## 2020-01-01 RX ADMIN — MULTIVITAMIN TABLET 1 TABLET: TABLET at 09:03

## 2020-01-01 RX ADMIN — THIAMINE HCL TAB 100 MG 100 MG: 100 TAB at 08:29

## 2020-01-01 RX ADMIN — PANTOPRAZOLE SODIUM 40 MG: 40 TABLET, DELAYED RELEASE ORAL at 08:28

## 2020-01-01 RX ADMIN — PIPERACILLIN AND TAZOBACTAM 3.38 G: 3; .375 INJECTION, POWDER, LYOPHILIZED, FOR SOLUTION INTRAVENOUS at 09:20

## 2020-01-01 RX ADMIN — FOLIC ACID 1 MG: 1 TABLET ORAL at 08:22

## 2020-01-01 RX ADMIN — INSULIN HUMAN 10 UNITS: 100 INJECTION, SOLUTION PARENTERAL at 20:40

## 2020-01-01 RX ADMIN — ATORVASTATIN CALCIUM 40 MG: 40 TABLET, FILM COATED ORAL at 23:22

## 2020-01-01 RX ADMIN — ASPIRIN 81 MG: 81 TABLET, COATED ORAL at 08:29

## 2020-01-01 RX ADMIN — PIPERACILLIN AND TAZOBACTAM 3.38 G: 3; .375 INJECTION, POWDER, LYOPHILIZED, FOR SOLUTION INTRAVENOUS at 22:16

## 2020-01-01 RX ADMIN — Medication 10 ML: at 08:31

## 2020-01-01 RX ADMIN — SODIUM BICARBONATE 650 MG TABLET 1300 MG: at 20:44

## 2020-01-01 RX ADMIN — ATORVASTATIN CALCIUM 40 MG: 40 TABLET, FILM COATED ORAL at 22:04

## 2020-12-10 PROBLEM — N17.9 ACUTE RENAL FAILURE (ARF) (HCC): Status: ACTIVE | Noted: 2020-01-01

## 2020-12-10 PROBLEM — I21.4 NSTEMI (NON-ST ELEVATED MYOCARDIAL INFARCTION) (HCC): Status: ACTIVE | Noted: 2020-01-01

## 2020-12-10 NOTE — ED PROVIDER NOTES
EMERGENCY DEPARTMENT HISTORY AND PHYSICAL EXAM 
 
 
Date: 12/10/2020 Patient Name: Teena Villa History of Presenting Illness Chief Complaint Patient presents with  Chest Pain History Provided By: Patient HPI: Teena Villa, 76 y.o. male with a past medical history significant hypertension, myocardial infarction and cad presents to the ED with cc of epigastric pain and burning for 2 weeks. Patient states he has intermittent epigastric pain and burning which has lasted approximately 2 weeks, states pain mid epigastrium with radiation up to chest, constant in nature, nonexertional, with associated nausea. Patient has stable dyspnea on exertion states he becomes short of breath when walking approximately 100 feet. Denies any chest pain per se. Denies any fevers chills, no coughs, no known history of GERD. Went to urgent care clinic was given GI cocktail with minimal relief. There are no other complaints, changes, or physical findings at this time. PCP: Kalia Hernandez NP Current Facility-Administered Medications Medication Dose Route Frequency Provider Last Rate Last Dose  heparin 25,000 units in D5W 250 ml infusion  12-25 Units/kg/hr IntraVENous TITRATE Gilma Rey MD 6.8 mL/hr at 12/10/20 2100 12 Units/kg/hr at 12/10/20 2100  
 heparin (porcine) 1,000 unit/mL injection 3,400 Units  60 Units/kg IntraVENous PRN Gilma Rey MD      
 Or  
 heparin (porcine) 1,000 unit/mL injection 1,700 Units  30 Units/kg IntraVENous PRN Gilma Rey MD      
 nitroglycerin (NITROSTAT) tablet 0.4 mg  0.4 mg SubLINGual PRN Gilma Rey MD   0.4 mg at 12/10/20 2052  [START ON 12/11/2020] aspirin delayed-release tablet 81 mg  81 mg Oral DAILY Samir Agudelo MD      
[facility] [START ON 12/11/2020] clopidogreL (PLAVIX) tablet 75 mg  75 mg Oral DAILY Samir Agudelo MD      
 metoprolol tartrate (LOPRESSOR) tablet 25 mg  25 mg Oral BID Samir Agudelo MD  [START ON 12/11/2020] omeprazole (PRILOSEC) capsule 40 mg  40 mg Oral DAILY Grace Nunez MD      
 sodium chloride (NS) flush 5-40 mL  5-40 mL IntraVENous Q8H Grace Nunez MD      
 sodium chloride (NS) flush 5-40 mL  5-40 mL IntraVENous PRN Grace Nunez MD      
 acetaminophen (TYLENOL) tablet 650 mg  650 mg Oral Q6H PRN Grace Nunez MD      
 Or  
[address] [place] [address] acetaminophen (TYLENOL) suppository 650 mg  650 mg Rectal Q6H PRN Grace Nunez MD      
 polyethylene glycol (MIRALAX) packet 17 g  17 g Oral DAILY PRN Grace Nunez MD      
 promethazine (PHENERGAN) tablet 12.5 mg  12.5 mg Oral Q6H PRN Grace Nunez MD      
 Or  
 ondansetron (ZOFRAN) injection 4 mg  4 mg IntraVENous Q6H PRN Grace Nunez MD      
 albuterol (PROVENTIL VENTOLIN) nebulizer solution 1.25 mg  1.25 mg Nebulization Q4H PRN Grace Nunez MD      
 0.9% sodium chloride infusion  125 mL/hr IntraVENous CONTINUOUS MD Roseline Chahal [facility] [START ON 12/11/2020] nicotine (NICODERM CQ) 14 mg/24 hr patch 1 Patch  1 Patch TransDERmal DAILY Grace Nunez MD      
 
Current Outpatient Medications Medication Sig Dispense Refill  clopidogreL (PLAVIX) 75 mg tab Take  by mouth.  aspirin delayed-release 81 mg tablet Take  by mouth daily.  losartan (COZAAR) 25 mg tablet Take  by mouth daily.  isosorbide mononitrate ER (IMDUR) 30 mg tablet Take  by mouth daily.  omeprazole (PRILOSEC) 20 mg capsule Take 20 mg by mouth daily.  nitroglycerin (Nitrostat) 0.4 mg SL tablet 0.4 mg by SubLINGual route every five (5) minutes as needed for Chest Pain. Up to 3 doses.  rosuvastatin (CRESTOR) 40 mg tablet Take 40 mg by mouth nightly.  loratadine (CLARITIN) 10 mg tablet Take 10 mg by mouth.  metoprolol tartrate (LOPRESSOR) 25 mg tablet Take 12.5 mg by mouth two (2) times a day.     
 acetaminophen (Pharbetol) 500 mg tablet Take by mouth every six (6) hours as needed for Pain. Past History Past Medical History: No past medical history on file. Past Surgical History: No past surgical history on file. Family History: No family history on file. Social History: 
Social History Tobacco Use  Smoking status: Not on file Substance Use Topics  Alcohol use: Not on file  Drug use: Not on file Allergies: 
No Known Allergies Review of Systems Review of Systems Constitutional: Positive for appetite change. Negative for activity change, chills and fever. HENT: Negative for congestion and sore throat. Eyes: Negative for photophobia and visual disturbance. Respiratory: Negative for chest tightness and shortness of breath. Cardiovascular: Negative for chest pain and palpitations. Gastrointestinal: Positive for abdominal pain, nausea and vomiting. Negative for constipation and diarrhea. Genitourinary: Negative for dysuria, flank pain and hematuria. Musculoskeletal: Negative for back pain and myalgias. Skin: Negative for color change, pallor and rash. Neurological: Negative for dizziness, weakness, light-headedness and headaches. Physical Exam  
 
Physical Exam 
Vitals signs and nursing note reviewed. Constitutional:   
   General: He is in acute distress. Appearance: He is well-developed and normal weight. He is not ill-appearing or toxic-appearing. HENT:  
   Head: Normocephalic and atraumatic. Mouth/Throat:  
   Mouth: Mucous membranes are moist.  
Eyes:  
   General: No scleral icterus. Extraocular Movements: Extraocular movements intact. Neck: Musculoskeletal: Normal range of motion and neck supple. Vascular: No JVD. Cardiovascular:  
   Rate and Rhythm: Normal rate and regular rhythm. Pulses:     
     Radial pulses are 2+ on the right side and 2+ on the left side.   
     Dorsalis pedis pulses are 1+ on the right side and 1+ on the left side.  
   Heart sounds: Normal heart sounds. Pulmonary:  
   Effort: Pulmonary effort is normal.  
   Breath sounds: Normal breath sounds. Chest:  
   Chest wall: No mass or tenderness. Abdominal:  
   General: Abdomen is flat. Bowel sounds are normal. There is no distension. Palpations: Abdomen is soft. Tenderness: There is abdominal tenderness in the epigastric area. There is no right CVA tenderness or left CVA tenderness. Musculoskeletal:  
   Right lower leg: He exhibits no tenderness. No edema. Left lower leg: He exhibits no tenderness. No edema. Skin: 
   General: Skin is warm and dry. Capillary Refill: Capillary refill takes less than 2 seconds. Neurological:  
   General: No focal deficit present. Mental Status: He is alert and oriented to person, place, and time. Cranial Nerves: No cranial nerve deficit. Motor: No weakness. Psychiatric:     
   Mood and Affect: Mood normal.     
   Behavior: Behavior normal.     
   Thought Content: Thought content normal.  
 
 
 
Diagnostic Study Results Labs - Recent Results (from the past 12 hour(s)) CBC WITH AUTOMATED DIFF Collection Time: 12/10/20  6:37 PM  
Result Value Ref Range WBC 7.5 4.1 - 11.1 K/uL  
 RBC 3.79 (L) 4.10 - 5.70 M/uL  
 HGB 12.7 12.1 - 17.0 g/dL HCT 38.5 36.6 - 50.3 % .6 (H) 80.0 - 99.0 FL  
 MCH 33.5 26.0 - 34.0 PG  
 MCHC 33.0 30.0 - 36.5 g/dL  
 RDW 14.0 11.5 - 14.5 % PLATELET 360 688 - 681 K/uL MPV 10.0 8.9 - 12.9 FL  
 NRBC 0.0 0  WBC ABSOLUTE NRBC 0.00 0.00 - 0.01 K/uL NEUTROPHILS 83 (H) 32 - 75 % LYMPHOCYTES 9 (L) 12 - 49 % MONOCYTES 6 5 - 13 % EOSINOPHILS 1 0 - 7 % BASOPHILS 0 0 - 1 % IMMATURE GRANULOCYTES 1 (H) 0.0 - 0.5 % ABS. NEUTROPHILS 6.4 1.8 - 8.0 K/UL  
 ABS. LYMPHOCYTES 0.7 (L) 0.8 - 3.5 K/UL  
 ABS. MONOCYTES 0.4 0.0 - 1.0 K/UL  
 ABS. EOSINOPHILS 0.0 0.0 - 0.4 K/UL  
 ABS. BASOPHILS 0.0 0.0 - 0.1 K/UL  
 ABS. IMM. GRANS. 0.0 0.00 - 0.04 K/UL  
 DF AUTOMATED METABOLIC PANEL, COMPREHENSIVE Collection Time: 12/10/20  6:37 PM  
Result Value Ref Range Sodium 136 136 - 145 mmol/L Potassium 6.3 (H) 3.5 - 5.1 mmol/L Chloride 109 (H) 97 - 108 mmol/L  
 CO2 11 (LL) 21 - 32 mmol/L Anion gap 16 (H) 5 - 15 mmol/L Glucose 95 65 - 100 mg/dL BUN 87 (H) 6 - 20 mg/dL Creatinine 12.90 (H) 0.70 - 1.30 mg/dL BUN/Creatinine ratio 7 (L) 12 - 20 GFR est AA 5 (L) >60 ml/min/1.73m2 GFR est non-AA 4 (L) >60 ml/min/1.73m2 Calcium 8.4 (L) 8.5 - 10.1 mg/dL Bilirubin, total 0.3 0.2 - 1.0 mg/dL AST (SGOT) 1,159 (H) 15 - 37 U/L  
 ALT (SGPT) 454 (H) 12 - 78 U/L Alk. phosphatase 104 45 - 117 U/L Protein, total 7.8 6.4 - 8.2 g/dL Albumin 2.6 (L) 3.5 - 5.0 g/dL Globulin 5.2 (H) 2.0 - 4.0 g/dL A-G Ratio 0.5 (L) 1.1 - 2.2    
TROPONIN I Collection Time: 12/10/20  6:37 PM  
Result Value Ref Range Troponin-I, Qt. 2.89 (H) <0.05 ng/mL LIPASE Collection Time: 12/10/20  6:37 PM  
Result Value Ref Range Lipase 469 (H) 73 - 393 U/L  
CBC WITH AUTOMATED DIFF Collection Time: 12/10/20  8:30 PM  
Result Value Ref Range WBC 8.4 4.1 - 11.1 K/uL  
 RBC 3.39 (L) 4.10 - 5.70 M/uL  
 HGB 11.2 (L) 12.1 - 17.0 g/dL HCT 34.3 (L) 36.6 - 50.3 % .2 (H) 80.0 - 99.0 FL  
 MCH 33.0 26.0 - 34.0 PG  
 MCHC 32.7 30.0 - 36.5 g/dL  
 RDW 14.2 11.5 - 14.5 % PLATELET 284 430 - 340 K/uL MPV 9.8 8.9 - 12.9 FL  
 NRBC 0.0 0  WBC ABSOLUTE NRBC 0.00 0.00 - 0.01 K/uL NEUTROPHILS 84 (H) 32 - 75 % LYMPHOCYTES 10 (L) 12 - 49 % MONOCYTES 6 5 - 13 % EOSINOPHILS 0 0 - 7 % BASOPHILS 0 0 - 1 % IMMATURE GRANULOCYTES 0 0.0 - 0.5 % ABS. NEUTROPHILS 7.0 1.8 - 8.0 K/UL  
 ABS. LYMPHOCYTES 0.9 0.8 - 3.5 K/UL  
 ABS. MONOCYTES 0.5 0.0 - 1.0 K/UL  
 ABS. EOSINOPHILS 0.0 0.0 - 0.4 K/UL  
 ABS. BASOPHILS 0.0 0.0 - 0.1 K/UL  
 ABS. IMM.  GRANS. 0.0 0.00 - 0.04 K/UL  
 DF AUTOMATED    
BNP Collection Time: 12/10/20  9:11 PM  
Result Value Ref Range NT pro-BNP 20,725 (H) <125 pg/mL LACTIC ACID Collection Time: 12/10/20  9:11 PM  
Result Value Ref Range Lactic acid 7.1 (HH) 0.4 - 2.0 mmol/L  
BLOOD GAS, ARTERIAL Collection Time: 12/10/20  9:35 PM  
Result Value Ref Range pH 7.25 (L) 7.35 - 7.45    
 PCO2 20 (L) 35 - 45 mmHg PO2 271 (H) 75 - 100 mmHg O2  >95 % BICARBONATE 12 (L) 22 - 26 mmol/L  
 BASE DEFICIT 16.9 (H) 0 - 2 mmol/L  
 O2 METHOD NRB mask O2 FLOW RATE 15.0 L/min FIO2 100.0 % Sample source Arterial    
 SITE Left Brachial    
 TAVO'S TEST Positive Critical value read back CALLED TO [facility] RN   
SARS-COV-2 Collection Time: 12/10/20 10:15 PM  
Result Value Ref Range SARS-CoV-2 Please find results under separate order COVID-19 RAPID TEST Collection Time: 12/10/20 10:15 PM  
Result Value Ref Range Specimen source Nasopharyngeal    
 COVID-19 rapid test Negative (A) Not Detected Radiologic Studies -  
[unfilled] CT Results  (Last 48 hours) None CXR Results  (Last 48 hours) 12/10/20 1815  XR CHEST PORT Final result Narrative:  Chest single view. Comparison single view chest December 5, 2019. Lungs clear. No interstitial or alveolar pulmonary edema. Cardiac and  
mediastinal structures unchanged. No pneumothorax or pleural effusion. Nonacute  
left rib fractures. Medical Decision Making and ED Course I am the first provider for this patient. I reviewed the vital signs, available nursing notes, past medical history, past surgical history, family history and social history. Vital Signs-Reviewed the patient's vital signs. Patient Vitals for the past 12 hrs: 
 Temp Pulse Resp BP SpO2  
12/10/20 2203     93 % 12/10/20 2114    97/76   
12/10/20 2110  (!) 101 24 (!) 85/70   
12/10/20 2052  100  (!) 141/113   
12/10/20 1859  (!) 107 17 (!) 141/99  12/10/20 1736 98.6 °F (37 °C) 98 18 (!) 154/105  EKG interpretation: (Preliminary) This rhythm 99, right bundle branch block, ST depressions in V3 through V6, no obvious ST elevations Records Reviewed: Nursing Notes The patient presents with abdominal pain with a differential diagnosis of abdominal pain, gastritis and pancreatitisj, ACS Provider Notes (Medical Decision Making): MDM  
27-year-old male, past medical history of CAD, presents emergency department with 1 week of epigastric burning pain. Denies any chest pain or shortness of breath. ED Course:  
Initial assessment performed. The patients presenting problems have been discussed, and they are in agreement with the care plan formulated and outlined with them. I have encouraged them to ask questions as they arise throughout their visit. ED Course as of Dec 10 2257 Thu Dec 10, 2020  
2023 Patient's lab work reviewed, troponin elevated at 2.8, CMP shows acute renal failure, creatinine 12.9, potassium 6.3, bicarb 11. CBC grossly unremarkable. Patient states he has no known history of renal failure. Still complaining of epigastric pain. I discussed patient and EKG findings and lab findings with Dr. Fred Cervantes, recommends starting heparin protocol, giving Brilinta 180 and aspirin p.o. Admit to hospitalist.  Will treat hyperkalemia with bicarb, insulin, glucose. [PZ] 2031 Discussed case with Dr. Ramon Dodson from nephrology, and feels that most likely patient is suffering from acute renal failure from obstruction, recommends placing Vega, 2 Amp of bicarb, Kayexalate, renal ultrasound in a.m.  
 [PZ] 2049 Repeat EKG shows unchanged ST depressions in anterior leads, right-sided EKG completed did not appreciate any ST elevations. I discussed case with Dr. Fred Cervantes again, states at this time no change, anticoagulate patient put him on Brilinta, will follow consult tomorrow. [PZ] 2054 Discussed case with , endorsed admission to ICU for NSTEMI, acute renal failure. Requesting bladder scan.    
 [PZ] 2128 Patient's blood pressure dropped after sublingual nitro, will bolus with 1 L normal saline [PZ] 2223 Patient's blood pressure normalized after 1 bolus of normal saline. Repeat BP 97/76 [PZ] ED Course User Index [PZ] Ena Patel MD  
 
 
 
Procedures Neelima Hernandez MD 
Procedures CRITICAL CARE NOTE : 
6:13 PM 
Amount of Critical Care Time: __90__(minutes)__ IMPENDING DETERIORATION -Cardiovascular ASSOCIATED RISK FACTORS - Hypotension, Shock, Dysrhythmia and Metabolic changes MANAGEMENT- Bedside Assessment and Supervision of Care INTERPRETATION -  Blood Gases, ECG and Blood Pressure INTERVENTIONS - hemodynamic mngmt and Metobolic interventions CASE REVIEW - Hospitalist, cardiologist, nephrologist 
TREATMENT RESPONSE -Improved PERFORMED BY - Self NOTES   : 
I have spent critical care time involved in lab review, consultations with specialist, family decision- making, bedside attention and documentation. This time excludes time spent in any separate billed procedures. During this entire length of time I was immediately available to the patient . Neelima Hernandez MD 
 
 
 
Disposition Admit to ICU Diagnosis Clinical Impression: 1. NSTEMI (non-ST elevated myocardial infarction) ([address] 2. Acute renal failure, unspecified acute renal failure type ([address] Attestations: 
 
Neelima Hernandez MD 
 
Please note that this dictation was completed with [employer], the computer voice recognition software. Quite often unanticipated grammatical, syntax, homophones, and other interpretive errors are inadvertently transcribed by the computer software. Please disregard these errors. Please excuse any errors that have escaped final proofreading. Thank you.

## 2020-12-10 NOTE — Clinical Note
Patient detached from all equipment. Venous and arterial sheaths left in place and secured with tegaderm.

## 2020-12-10 NOTE — Clinical Note
TRANSFER - OUT REPORT:     Verbal report given to: Dalila Godoy . Report consisted of patient's Situation, Background, Assessment and   Recommendations(SBAR). Opportunity for questions and clarification was provided. Patient transported with a Registered Nurse. Patient transported to: ICU.

## 2020-12-10 NOTE — ED TRIAGE NOTES
Pt states that he has been having a burning chest pain for a long time but has really gotten worse during the last two weeks.

## 2020-12-10 NOTE — Clinical Note
Access needle inserted to R IJ using ultrasound guidance. 20cm trialysis inserted to R IJ, +blood return from all ports, all ports flushed with NS without difficulty, additional lidocaine administered prior to sutures. Line secured with sutures. Biopatch and tegaderm at site. Post procedure cxr ordered in epic. Bilateral dialysis ports loaded with 5,000 units heparin by ESTEFANI Cohn. Dressing dry and intact. Report given to primary ICU nurse.

## 2020-12-11 PROBLEM — F10.10 ALCOHOL ABUSE: Status: ACTIVE | Noted: 2020-01-01

## 2020-12-11 PROBLEM — R10.9 ABDOMINAL PAIN: Status: ACTIVE | Noted: 2020-01-01

## 2020-12-11 PROBLEM — E87.5 HYPERKALEMIA: Status: ACTIVE | Noted: 2020-01-01

## 2020-12-11 PROBLEM — A41.9 SEPSIS (HCC): Status: ACTIVE | Noted: 2020-01-01

## 2020-12-11 NOTE — CONSULTS
[id number] [facility] SURGERY CONSULT Chief Complaint: mid chest pain History of Present Illness: Mr. Josep Burns is a 76y.o. year old * male presented  to [facility] with upper mid abdominal & chest pain for the past 2-3 weeks without any nausea or vomiting. No fever or chills. He has history of CAD with stent placement in 2019. Hence seen by Cardiologist. Since CT scan of abdomen and pelvis showed distended gallbladder Surgical consultation requested to rule out cholecystitis. He denied ever had right upper abdominal pain. Past Medical History:  
Past Medical History:  
Diagnosis Date  CAD (coronary artery disease)  GERD (gastroesophageal reflux disease)  Hypertension Past Surgical History: History reviewed. No pertinent surgical history. Allergy:No Known Allergies Social History:  reports that he has quit smoking. He has never used smokeless tobacco. He reports current alcohol use. He reports that he does not use drugs. Family History: 
Family History Problem Relation Age of Onset  No Known Problems Mother  No Known Problems Father Current Medications: 
Current Facility-Administered Medications:  
  thiamine mononitrate (B-1) tablet 100 mg, 100 mg, Oral, DAILY, Marshall Howell MD, 100 mg at 12/11/20 1961   folic acid (FOLVITE) tablet 1 mg, 1 mg, Oral, DAILY, Marshall Howell MD, 1 mg at 12/11/20 4362   multivitamin (ONE A DAY) tablet 1 Tab, 1 Tab, Oral, DAILY, Marshall Howell MD, 1 Tab at 12/11/20 3792   piperacillin-tazobactam (ZOSYN) 3.375 g in 0.9% sodium chloride (MBP/ADV) 100 mL MBP, 3.375 g, IntraVENous, Q12H, Madan Lizarraga MD, Last Rate: 200 mL/hr at 12/11/20 0920, 3.375 g at 12/11/20 0920   sevelamer carbonate (RENVELA) tab 1,600 mg, 1,600 mg, Oral, TID WITH MEALS, Kenny Sanchez MD 
  sodium bicarbonate tablet 1,300 mg, 1,300 mg, Oral, TID, Kenny Sanchez MD 
  heparin 25,000 units in D5W 250 ml infusion, 12-25 Units/kg/hr, IntraVENous, TITRATE, Stephen Jalloh MD, Stopped at 12/10/20 5143   heparin (porcine) 1,000 unit/mL injection 3,400 Units, 60 Units/kg, IntraVENous, PRN **OR** heparin (porcine) 1,000 unit/mL injection 1,700 Units, 30 Units/kg, IntraVENous, PRN, Stephen Jalloh MD 
  nitroglycerin (NITROSTAT) tablet 0.4 mg, 0.4 mg, SubLINGual, PRN, Stephen Jalloh MD, 0.4 mg at 12/10/20 2052   aspirin delayed-release tablet 81 mg, 81 mg, Oral, DAILY, Brenton Lay MD, 81 mg at 12/11/20 7203   clopidogreL (PLAVIX) tablet 75 mg, 75 mg, Oral, DAILY, Brenton Lay MD, 75 mg at 12/11/20 0036   metoprolol tartrate (LOPRESSOR) tablet 25 mg, 25 mg, Oral, BID, Brenton Lay MD, Stopped at 12/11/20 0900   pantoprazole (PROTONIX) tablet 40 mg, 40 mg, Oral, DAILY, Brenton Lay MD, 40 mg at 12/11/20 6675   sodium chloride (NS) flush 5-40 mL, 5-40 mL, IntraVENous, Q8H, Brenton Lay MD, 10 mL at 12/10/20 2225   sodium chloride (NS) flush 5-40 mL, 5-40 mL, IntraVENous, PRN, Brenton Lay MD 
  polyethylene glycol (MIRALAX) packet 17 g, 17 g, Oral, DAILY PRN, Brenton Lay MD 
  promethazine (PHENERGAN) tablet 12.5 mg, 12.5 mg, Oral, Q6H PRN **OR** ondansetron (ZOFRAN) injection 4 mg, 4 mg, IntraVENous, Q6H PRN, Brenton Lay MD 
  albuterol CONCENTRATE 2.5mg/0.5 mL neb soln, 1.25 mg, Nebulization, Q4H PRN, Brenton Lay MD 
  0.9% sodium chloride infusion, 125 mL/hr, IntraVENous, CONTINUOUS, Brenton Lay MD, Last Rate: 125 mL/hr at 12/11/20 0141, 125 mL/hr at 12/11/20 0141 
  nicotine (NICODERM CQ) 14 mg/24 hr patch 1 Patch, 1 Patch, TransDERmal, DAILY, Brenton Lay MD  
 
Immunization History: There is no immunization history on file for this patient. Complete Review of Systems:  
 
Constitutional:  no fever,  no chills,  no sweats, No weakness, No fatigue, No decreased activity.  
Eye: No recent visual problem, No icterus, No discharge, No double vision. Ear/Nose/Mouth/Throat: No decreased hearing, No ear pain, No nasal congestion, No sore throat. Respiratory: No shortness of breath, No cough, No sputum production, No hemoptysis, No wheezing, No cyanosis. Cardiovascular:  chest pain, No palpitations, No bradycardia, No tachycardia, No peripheral edema, No syncope. Gastrointestinal: No nausea,  No vomiting, No diarrhea, No constipation, No heartburn,  No abdominal pain. Genitourinary: No dysuria, No hematuria, No change in urine stream, No urethral discharge, No lesions. Hematology/Lymphatics: No bruising tendency, No bleeding tendency, No petechiae, No swollen lymph glands. Endocrine: No excessive thirst, No polyuria, No cold intolerance, No heat intolerance, No excessive hunger. Immunologic: Not immunocompromised, No recurrent fevers, No recurrent infections. Musculoskeletal: No back pain, No neck pain, No joint pain, No muscle pain, No claudication, No decreased range of motion, No trauma. Integumentary: No rash, No pruritus, No abrasions. Neurologic: Alert and oriented X4, No abnormal balance, No headache, No confusion, No numbness, No tingling. Psychiatric: No anxiety, No depression, No lu. Physical Exam:  
 
Vitals & Measurements: Wt Readings from Last 3 Encounters:  
12/11/20 52.6 kg (115 lb 15.4 oz) Temp Readings from Last 3 Encounters:  
12/11/20 (!) 100.8 °F (38.2 °C) BP Readings from Last 3 Encounters:  
12/11/20 110/66 Pulse Readings from Last 3 Encounters:  
12/11/20 83 Ht Readings from Last 3 Encounters:  
12/11/20 5' 10\" (1.778 m) General: well appearing, no acute distress, very comfortable Head: Normal 
Face: Nornal 
HEENT: atraumatic, PERRLA, moist mucosa, normal pharynx, normal tonsils and adenoids, normal tongue, no fluid in sinuses Neck: Trachea midline, no carotid bruit, no masses Chest: Normal. 
Respiratory: Normal chest wall expansion, CTA B, no r/r/w, no rubs Cardiovascular: RRR, no m/r/g, Normal S1 and S2 Abdomen: Soft, non tender, non-distended, normal bowel sounds in all quadrants, no hepatosplenomegaly, no tympany. Incision scar: None. No Pugh's sign. Genitourinary: No inguinal hernia, normal external gentalia, Testis & scrotum normal, no renal angle tenderness Rectal: deferred Musculoskeletal: normal ROM in upper and lower extremities, No joint swelling. Integumentary: Warm, dry, and pink, with no rash, purpura, or petechia Heme/Lymph: No lymphadenopathy, no bruises Neurological:Cranial Nerves II-XII grossly intact, no gross motor or sensory deficit Psychiatric: Cooperative with normal mood, affect, and cognition Laboratory Values:  
Recent Results (from the past 24 hour(s)) CBC WITH AUTOMATED DIFF Collection Time: 12/10/20  6:37 PM  
Result Value Ref Range WBC 7.5 4.1 - 11.1 K/uL  
 RBC 3.79 (L) 4.10 - 5.70 M/uL  
 HGB 12.7 12.1 - 17.0 g/dL HCT 38.5 36.6 - 50.3 % .6 (H) 80.0 - 99.0 FL  
 MCH 33.5 26.0 - 34.0 PG  
 MCHC 33.0 30.0 - 36.5 g/dL  
 RDW 14.0 11.5 - 14.5 % PLATELET 776 011 - 175 K/uL MPV 10.0 8.9 - 12.9 FL  
 NRBC 0.0 0  WBC ABSOLUTE NRBC 0.00 0.00 - 0.01 K/uL NEUTROPHILS 83 (H) 32 - 75 % LYMPHOCYTES 9 (L) 12 - 49 % MONOCYTES 6 5 - 13 % EOSINOPHILS 1 0 - 7 % BASOPHILS 0 0 - 1 % IMMATURE GRANULOCYTES 1 (H) 0.0 - 0.5 % ABS. NEUTROPHILS 6.4 1.8 - 8.0 K/UL  
 ABS. LYMPHOCYTES 0.7 (L) 0.8 - 3.5 K/UL  
 ABS. MONOCYTES 0.4 0.0 - 1.0 K/UL  
 ABS. EOSINOPHILS 0.0 0.0 - 0.4 K/UL  
 ABS. BASOPHILS 0.0 0.0 - 0.1 K/UL  
 ABS. IMM. GRANS. 0.0 0.00 - 0.04 K/UL  
 DF AUTOMATED METABOLIC PANEL, COMPREHENSIVE Collection Time: 12/10/20  6:37 PM  
Result Value Ref Range Sodium 136 136 - 145 mmol/L Potassium 6.3 (H) 3.5 - 5.1 mmol/L Chloride 109 (H) 97 - 108 mmol/L  
 CO2 11 (LL) 21 - 32 mmol/L Anion gap 16 (H) 5 - 15 mmol/L Glucose 95 65 - 100 mg/dL BUN 87 (H) 6 - 20 mg/dL Creatinine 12.90 (H) 0.70 - 1.30 mg/dL BUN/Creatinine ratio 7 (L) 12 - 20 GFR est AA 5 (L) >60 ml/min/1.73m2 GFR est non-AA 4 (L) >60 ml/min/1.73m2 Calcium 8.4 (L) 8.5 - 10.1 mg/dL Bilirubin, total 0.3 0.2 - 1.0 mg/dL AST (SGOT) 1,159 (H) 15 - 37 U/L  
 ALT (SGPT) 454 (H) 12 - 78 U/L Alk. phosphatase 104 45 - 117 U/L Protein, total 7.8 6.4 - 8.2 g/dL Albumin 2.6 (L) 3.5 - 5.0 g/dL Globulin 5.2 (H) 2.0 - 4.0 g/dL A-G Ratio 0.5 (L) 1.1 - 2.2    
TROPONIN I Collection Time: 12/10/20  6:37 PM  
Result Value Ref Range Troponin-I, Qt. 2.89 (H) <0.05 ng/mL LIPASE Collection Time: 12/10/20  6:37 PM  
Result Value Ref Range Lipase 469 (H) 73 - 393 U/L  
CBC WITH AUTOMATED DIFF Collection Time: 12/10/20  8:30 PM  
Result Value Ref Range WBC 8.4 4.1 - 11.1 K/uL  
 RBC 3.39 (L) 4.10 - 5.70 M/uL  
 HGB 11.2 (L) 12.1 - 17.0 g/dL HCT 34.3 (L) 36.6 - 50.3 % .2 (H) 80.0 - 99.0 FL  
 MCH 33.0 26.0 - 34.0 PG  
 MCHC 32.7 30.0 - 36.5 g/dL  
 RDW 14.2 11.5 - 14.5 % PLATELET 272 230 - 259 K/uL MPV 9.8 8.9 - 12.9 FL  
 NRBC 0.0 0  WBC ABSOLUTE NRBC 0.00 0.00 - 0.01 K/uL NEUTROPHILS 84 (H) 32 - 75 % LYMPHOCYTES 10 (L) 12 - 49 % MONOCYTES 6 5 - 13 % EOSINOPHILS 0 0 - 7 % BASOPHILS 0 0 - 1 % IMMATURE GRANULOCYTES 0 0.0 - 0.5 % ABS. NEUTROPHILS 7.0 1.8 - 8.0 K/UL  
 ABS. LYMPHOCYTES 0.9 0.8 - 3.5 K/UL  
 ABS. MONOCYTES 0.5 0.0 - 1.0 K/UL  
 ABS. EOSINOPHILS 0.0 0.0 - 0.4 K/UL  
 ABS. BASOPHILS 0.0 0.0 - 0.1 K/UL  
 ABS. IMM. GRANS. 0.0 0.00 - 0.04 K/UL  
 DF AUTOMATED    
BNP Collection Time: 12/10/20  9:11 PM  
Result Value Ref Range NT pro-BNP 20,725 (H) <125 pg/mL LACTIC ACID Collection Time: 12/10/20  9:11 PM  
Result Value Ref Range Lactic acid 7.1 (HH) 0.4 - 2.0 mmol/L MAGNESIUM Collection Time: 12/10/20  9:11 PM  
Result Value Ref Range Magnesium 2.5 (H) 1.6 - 2.4 mg/dL URIC ACID  
 Collection Time: 12/10/20  9:11 PM  
Result Value Ref Range Uric acid 6.6 3.5 - 7.2 mg/dL CK Collection Time: 12/10/20  9:11 PM  
Result Value Ref Range CK Hemolyzed, recollect requested 39 - 308 U/L  
PHOSPHORUS Collection Time: 12/10/20  9:11 PM  
Result Value Ref Range Phosphorus 9.1 (H) 2.6 - 4.7 mg/dL TROPONIN I Collection Time: 12/10/20  9:11 PM  
Result Value Ref Range Troponin-I, Qt. 2.96 (H) <0.05 ng/mL POTASSIUM Collection Time: 12/10/20  9:11 PM  
Result Value Ref Range Potassium 6.4 (H) 3.5 - 5.1 mmol/L  
BLOOD GAS, ARTERIAL Collection Time: 12/10/20  9:35 PM  
Result Value Ref Range pH 7.25 (L) 7.35 - 7.45    
 PCO2 20 (L) 35 - 45 mmHg PO2 271 (H) 75 - 100 mmHg O2  >95 % BICARBONATE 12 (L) 22 - 26 mmol/L  
 BASE DEFICIT 16.9 (H) 0 - 2 mmol/L  
 O2 METHOD NRB mask O2 FLOW RATE 15.0 L/min FIO2 100.0 % Sample source Arterial    
 SITE Left Brachial    
 TAVO'S TEST Positive Critical value read back CALLED TO [facility] [place] RN   
SARS-COV-2 Collection Time: 12/10/20 10:15 PM  
Result Value Ref Range SARS-CoV-2 Please find results under separate order COVID-19 RAPID TEST Collection Time: 12/10/20 10:15 PM  
Result Value Ref Range Specimen source Nasopharyngeal    
 COVID-19 rapid test Negative (A) Not Detected PTT Collection Time: 12/10/20 10:52 PM  
Result Value Ref Range aPTT >153.0 (HH) 23.0 - 35.7 sec  
 aPTT, therapeutic range   68 - 109 sec MRSA SCREEN - PCR (NASAL) Collection Time: 12/11/20  1:15 AM  
Result Value Ref Range MRSA by PCR, Nasal Not Detected Not Detected PTT Collection Time: 12/11/20  2:30 AM  
Result Value Ref Range aPTT 45.3 (H) 23.0 - 35.7 sec  
 aPTT, therapeutic range   68 - 109 sec METABOLIC PANEL, COMPREHENSIVE Collection Time: 12/11/20  3:16 AM  
Result Value Ref Range Sodium 138 136 - 145 mmol/L Potassium 4.8 3.5 - 5.1 mmol/L  Chloride 106 97 - 108 mmol/L CO2 17 (L) 21 - 32 mmol/L Anion gap 15 5 - 15 mmol/L Glucose 53 (L) 65 - 100 mg/dL  (H) 6 - 20 mg/dL Creatinine 13.80 (H) 0.70 - 1.30 mg/dL BUN/Creatinine ratio 7 (L) 12 - 20 GFR est AA 4 (L) >60 ml/min/1.73m2 GFR est non-AA 4 (L) >60 ml/min/1.73m2 Calcium 8.6 8.5 - 10.1 mg/dL Bilirubin, total 0.3 0.2 - 1.0 mg/dL AST (SGOT) 1,166 (H) 15 - 37 U/L  
 ALT (SGPT) 438 (H) 12 - 78 U/L Alk. phosphatase 98 45 - 117 U/L Protein, total 6.9 6.4 - 8.2 g/dL Albumin 2.4 (L) 3.5 - 5.0 g/dL Globulin 4.5 (H) 2.0 - 4.0 g/dL A-G Ratio 0.5 (L) 1.1 - 2.2    
CBC WITH AUTOMATED DIFF Collection Time: 12/11/20  3:16 AM  
Result Value Ref Range WBC 12.1 (H) 4.1 - 11.1 K/uL  
 RBC 3.04 (L) 4.10 - 5.70 M/uL  
 HGB 10.2 (L) 12.1 - 17.0 g/dL HCT 29.9 (L) 36.6 - 50.3 % MCV 98.4 80.0 - 99.0 FL  
 MCH 33.6 26.0 - 34.0 PG  
 MCHC 34.1 30.0 - 36.5 g/dL  
 RDW 13.6 11.5 - 14.5 % PLATELET 296 313 - 875 K/uL MPV 9.7 8.9 - 12.9 FL  
 NEUTROPHILS 89 (H) 32 - 75 % LYMPHOCYTES 4 (L) 12 - 49 % MONOCYTES 7 5 - 13 % EOSINOPHILS 0 0 - 7 % BASOPHILS 0 0 - 1 % IMMATURE GRANULOCYTES 0 0.0 - 0.5 % ABS. NEUTROPHILS 10.8 (H) 1.8 - 8.0 K/UL  
 ABS. LYMPHOCYTES 0.5 (L) 0.8 - 3.5 K/UL  
 ABS. MONOCYTES 0.8 0.0 - 1.0 K/UL  
 ABS. EOSINOPHILS 0.0 0.0 - 0.4 K/UL  
 ABS. BASOPHILS 0.0 0.0 - 0.1 K/UL  
 ABS. IMM. GRANS. 0.1 (H) 0.00 - 0.04 K/UL  
 DF AUTOMATED    
GLUCOSE, POC Collection Time: 12/11/20  7:38 AM  
Result Value Ref Range Glucose (POC) 85 65 - 100 mg/dL Performed by Figure 1   
ECHO ADULT COMPLETE Collection Time: 12/11/20 11:45 AM  
Result Value Ref Range Pulmonic Regurgitant End Max Velocity 68.80 cm/s AoV PG 2.00 mmHg Aortic Valve Area by Continuity of Peak Velocity 2.59 cm2 IVSd 0.80 0.6 - 1.0 cm LVIDd 3.68 (A) 4.2 - 5.9 cm LVIDs 2.30 cm LVOT d 1.80 cm Pulmonic Regurgitant End Max Velocity 70.10 cm/s  LVOT Peak Gradient 2.00 mmHg LVPWd 0.86 0.6 - 1.0 cm  
 LV E' Septal Velocity 6.24 cm/s LV ED Vol A2C 49.80 cm3 BP EF 67.9 55 - 100 % LV ES Vol A2C 12.20 cm3 E/E' septal 6.83   
 LV Ejection Fraction MOD 2C 38 % Pulmonic Regurgitant End Max Velocity 541.00 cm/s Mitral Valve Deceleration Barry 2,980.00 mm/s2 Mitral Valve Deceleration Barry 2,980.00 mm/s2 Mitral Valve E Wave Deceleration Time 169.00 ms Mitral Valve Pressure Half-time 64.00 ms MV A Saqib 64.20 cm/s  
 MV E Saqib 42.60 cm/s  
 MVA (PHT) 3.44 cm2  
 MV E/A 0.66 Pulmonic Regurgitant End Max Velocity 154.00 cm/s Pulmonic Valve Systolic Peak Instantaneous Gradient 9.00 mmHg Pulmonic Regurgitant End Max Velocity 93.50 cm/s Pulmonic Valve Systolic Peak Instantaneous Gradient 3.00 mmHg P Vein A Dur 81.00 ms Pulmonary Vein \"A\" Wave Velocity 22.50 cm/s  
 Est. RA Pressure 3.00 mmHg RVIDd 2.60 cm RVSP 33.00 mmHg Tricuspid Valve Max Velocity 275.00 cm/s Triscuspid Valve Regurgitation Peak Gradient 30.00 mmHg LV Mass AL 85.8 88 - 224 g LV Mass AL Index 51.8 49 - 115 g/m2 JOSSELINE/BSA Pk Saqib 1.6 cm2/m2 CT ABD PELV WO CONT Final Result IMPRESSION:  
1. No hydronephrosis. 2. Small nonobstructing left renal upper pole sinus calculus. No ureter  
calculus. 3. Markedly distended gallbladder without other findings potentially reflecting  
cholecystitis. 4. Atherosclerosis. 5. Peripheral ill-defined and nodular densities in the right lower lobe, could  
reflect acute inflammatory disease or infection among other possibilities. XR CHEST PORT Final Result IR INSERT NON TUNL CVC OVER 5 YRS    (Results Pending) US RETROPERITONEUM COMP    (Results Pending) IR US GUIDED VASCULAR ACCESS    (Results Pending) XR CHEST PORT    (Results Pending) US ABD LTD    (Results Pending) Assessment: 
Problem List Items Addressed This Visit Circulatory  NSTEMI (non-ST elevated myocardial infarction) [facility]) - Primary Relevant Medications  
 clopidogreL (PLAVIX) 75 mg tab  
 aspirin delayed-release 81 mg tablet  
 losartan (COZAAR) 25 mg tablet  
 isosorbide mononitrate ER (IMDUR) 30 mg tablet  
 nitroglycerin (Nitrostat) 0.4 mg SL tablet  
 rosuvastatin (CRESTOR) 40 mg tablet  
 metoprolol tartrate (LOPRESSOR) 25 mg tablet Other Visit Diagnoses Acute renal failure, unspecified acute renal failure type ([address] Relevant Medications  
 losartan (COZAAR) 25 mg tablet Abnormal Gallbladder Plan: 1. Admission 2. Diet: NPO  
3. IV fluids 4. SCD 5. IS 
6. Pain medications 7. Antibiotics 8. Nausea medication 9. Labs 10. Consult: cardiology & surgery 11. Stat Ultrasound of abdomen. Thank you for the consultation & allowing me to participate in the care of this patient.

## 2020-12-11 NOTE — PROCEDURES
Interventional Radiology Brief Procedure Note Patient: Alka Chaidez MRN: [id number]  SSN: [id number] YOB: 1955  Age: 76 y.o. Sex: male Date of Procedure: 12/11/2020 Pre-Procedure Diagnosis: renal failure Post-Procedure Diagnosis: SAME Procedure(s): Temporary hemodialysis Catheter Brief Description of Procedure: Bedside US guided central venous access. Performed By: Vita Mcgee DO Assistants: CARISSA Flowers Anesthesia: Lidocaine Estimated Blood Loss: Less than 10ml Specimens: None Implants: Temporary Hemodialysis Catheter Findings: widely patent RIJV Complications: None

## 2020-12-11 NOTE — PROGRESS NOTES
CM contacted patient via phone to complete assessment. Patient was admitted to the hospital for generalized weakness, shortness of breath, and hypoxia. RUR score is 14%. Patient resides in a two story rooming house. Patient has been living in the rooming house for six years. Patient has three housemates and resides on the lower level of rooming house. Patient uses no DME, does not drive, and has no history of falling. Patient has a new PCP; patient could not remember PCP's name or last appointment date. Patient has no POA and no AMD. Patient has never received HH or been to SNF. Emergency contact is  Joy Chan [phone]). Discharge planning needs is to home at this time.

## 2020-12-11 NOTE — ED NOTES
Received verbal orders from Dr. Hallie Cramer for two boluses of 0.9% NS STAT. Nurse writing questioned order due to BNP of 21,000. Charge nurse aware, nursing supervisor aware. Fluid running at 350ml/hour per charge nurse recommendation. Lung sounds are clear. Will continue to monitor and assess respiratory status.

## 2020-12-11 NOTE — CONSULTS
Renal Consult Note Admit Date: 12/10/2020 HPI:  
Patient was seen earlier this morning. He is a 79-year-old -American gentleman with no known history of renal disease. He is known to have coronary artery disease and has undergone PCI in December 2019. He has apparently been feeling poorly since Thanksgiving this year. He has had increasing weakness. His appetite has been poor. He has had nausea without vomiting. Saliva has medicine a taste to it. He presented to the emergency room yesterday with a creatinine of 12.9 mg.  Vega catheter was inserted without significant urine output. Potassium was 6.3 mEq. He was given Kayexalate and insulin and D50 and bicarbonate. At present he still feels poorly. He complains of midsternal and epigastric burning sensation. He sleeps on one pillow. He complains of exertional dyspnea. There is no history of renal stones. He denies nonsteroidal drug use. He has noticed that he has not been passing much urine over the last 3 to 4 days. He used to drink about 80 ounces of beer a day until Thanksgiving. He has since stopped alcohol. CT of the abdomen showed no evidence of hydronephrosis. There was a left upper lobe pole nonobstructing calculus. Current Facility-Administered Medications Medication Dose Route Frequency  thiamine mononitrate (B-1) tablet 100 mg  100 mg Oral DAILY  folic acid (FOLVITE) tablet 1 mg  1 mg Oral DAILY  multivitamin (ONE A DAY) tablet 1 Tab  1 Tab Oral DAILY  piperacillin-tazobactam (ZOSYN) 3.375 g in 0.9% sodium chloride (MBP/ADV) 100 mL MBP  3.375 g IntraVENous Q12H  sevelamer carbonate (RENVELA) tab 1,600 mg  1,600 mg Oral TID WITH MEALS  sodium bicarbonate tablet 1,300 mg  1,300 mg Oral TID  heparin (porcine) 1,000 unit/mL injection 2,500 Units  2,500 Units Hemodialysis PRN  
 atorvastatin (LIPITOR) tablet 40 mg  40 mg Oral QHS  heparin 25,000 units in D5W 250 ml infusion  12-25 Units/kg/hr IntraVENous TITRATE  heparin (porcine) 1,000 unit/mL injection 3,400 Units  60 Units/kg IntraVENous PRN Or  
 heparin (porcine) 1,000 unit/mL injection 1,700 Units  30 Units/kg IntraVENous PRN  
 nitroglycerin (NITROSTAT) tablet 0.4 mg  0.4 mg SubLINGual PRN  
 aspirin delayed-release tablet 81 mg  81 mg Oral DAILY  [Held by provider] clopidogreL (PLAVIX) tablet 75 mg  75 mg Oral DAILY  metoprolol tartrate (LOPRESSOR) tablet 25 mg  25 mg Oral BID  pantoprazole (PROTONIX) tablet 40 mg  40 mg Oral DAILY  sodium chloride (NS) flush 5-40 mL  5-40 mL IntraVENous Q8H  
 sodium chloride (NS) flush 5-40 mL  5-40 mL IntraVENous PRN  polyethylene glycol (MIRALAX) packet 17 g  17 g Oral DAILY PRN  promethazine (PHENERGAN) tablet 12.5 mg  12.5 mg Oral Q6H PRN Or  
 ondansetron (ZOFRAN) injection 4 mg  4 mg IntraVENous Q6H PRN  
 albuterol CONCENTRATE 2.5mg/0.5 mL neb soln  1.25 mg Nebulization Q4H PRN  
 0.9% sodium chloride infusion  125 mL/hr IntraVENous CONTINUOUS  
 nicotine (NICODERM CQ) 14 mg/24 hr patch 1 Patch  1 Patch TransDERmal DAILY Past Medical History:  
Diagnosis Date  CAD (coronary artery disease)  GERD (gastroesophageal reflux disease)  Hypertension Past Surgical History:  
Procedure Laterality Date  IR INSERT NON TUNL CVC OVER 5 YRS  12/11/2020 Family History Problem Relation Age of Onset  No Known Problems Mother  No Known Problems Father Social History Tobacco Use  Smoking status: Former Smoker  Smokeless tobacco: Never Used Substance Use Topics  Alcohol use: Yes Frequency: 4 or more times a week Comment: Did not drink since Thanksgiving. Review of Systems Review of Systems Constitutional: Negative for chills and fever. Respiratory: Positive for shortness of breath. Negative for cough. Cardiovascular: Negative for chest pain, palpitations, leg swelling and PND.   
Gastrointestinal: Positive for abdominal pain, heartburn and nausea. Negative for vomiting. Musculoskeletal: Negative for back pain and joint pain. Skin: Negative for itching and rash. Neurological: Negative for dizziness and headaches. Physical Exam:  
 
Physical Exam  
Constitutional: He is oriented to person, place, and time. Neck: No JVD present. Cardiovascular: Normal rate. Exam reveals friction rub. Pulmonary/Chest: Effort normal.  
Abdominal: Soft. Bowel sounds are normal. There is abdominal tenderness. Musculoskeletal:     
   General: No edema. Neurological: He is alert and oriented to person, place, and time. Skin: Skin is warm. He had asterixis Patient Vitals for the past 8 hrs: 
 BP Temp Pulse Resp SpO2 Height Weight 12/11/20 1449 (P) 117/77  84  (P) 100 %    
12/11/20 1445   83 23 100 %    
12/11/20 1433   87 23 100 %    
12/11/20 1415   87 23 100 %    
12/11/20 1400   88 23 100 %    
12/11/20 1345   98 24 100 %    
12/11/20 1330   96      
12/11/20 1315 91/61  92 18 100 %    
12/11/20 1303 (!) 78/53  87 20 100 %    
12/11/20 1300      5' 10\" (1.778 m)   
12/11/20 1245 110/75        
12/11/20 1233 113/73 98.2 °F (36.8 °C)       
12/11/20 1200 115/81  83 16 100 %    
12/11/20 1130 110/66  83 16 99 %    
12/11/20 1100 110/66  84 17 100 %    
12/11/20 1044 116/67     5' 10\" (1.778 m) 52.6 kg (115 lb 15.4 oz) 12/11/20 1000 112/72  87 14 100 %    
12/11/20 0900 125/81  91 17 100 %    
12/11/20 0800 129/78  96 18 99 %    
 
12/11 0701 - 12/11 1900 In: -  
Out: -100  
12/09 1901 - 12/11 0700 In: 10 Out: -  
 
 
   
US ABD COMP Final Result Impression: No ultrasound evidence for infectious/inflammatory process involving  
the gallbladder. XR CHEST PORT Final Result Impression:   
Successful placement of a nontunneled hemodialysis catheter. The catheter is  
ready for use.   
  
IR INSERT NON TUNL CVC OVER 5 YRS Final Result Impression:   
Successful placement of a nontunneled hemodialysis catheter. The catheter is  
ready for use. IR [facility] Final Result Impression:   
Successful placement of a nontunneled hemodialysis catheter. The catheter is  
ready for use. CT ABD PELV WO CONT Final Result IMPRESSION:  
1. No hydronephrosis. 2. Small nonobstructing left renal upper pole sinus calculus. No ureter  
calculus. 3. Markedly distended gallbladder without other findings potentially reflecting  
cholecystitis. 4. Atherosclerosis. 5. Peripheral ill-defined and nodular densities in the right lower lobe, could  
reflect acute inflammatory disease or infection among other possibilities. XR CHEST PORT Final Result US RETROPERITONEUM COMP    (Results Pending) US ABD LTD    (Results Pending) Data Review Recent Labs 12/11/20 
0316 12/10/20 
2030 12/10/20 
1837 WBC 12.1* 8.4 7.5 HGB 10.2* 11.2* 12.7 HCT 29.9* 34.3* 38.5  354 330 Recent Labs 12/11/20 
0316 12/10/20 
2111 12/10/20 
1837   --  136  
K 4.8 6.4* 6.3*  
  --  109* CO2 17*  --  11* GLU 53*  --  95 *  --  87* CREA 13.80*  --  12.90* CA 8.6  --  8.4* MG  --  2.5*  --   
PHOS  --  9.1*  --   
ALB 2.4*  --  2.6* *  --  454* No components found for: [place] Recent Labs 12/10/20 
2135 PH 7.25* PCO2 20* PO2 271* HCO3 12* FIO2 100.0 No results for input(s): INR, INREXT, INREXT in the last 72 hours. Assessment:  
 
 
 
 
Active Problems: 
  NSTEMI (non-ST elevated myocardial infarction) ([facility] [address] (12/10/2020) Acute renal failure (ARF) ([facility] [address] (12/10/2020) Alcohol abuse (12/11/2020) Abdominal pain (12/11/2020) Hyperkalemia (12/11/2020) Sepsis ([facility] [address] (12/11/2020) Uremia Metabolic acidosis with an AG of 15. Bicarbonate deficit is 240 mEq. Normocytic anemia SHAWNA with hematuria and proteinuria. His chest x-ray is clear. Fractional excretion of sodium is 3.5%. Will work him up for an nephritic syndrome quantitating the proteinuria checking serum complements and hepatitis panel. I note that his serum phosphorus is 9.1 indicating long duration of SHAWNA. Will need to check a CPK. We will check a PTH and a renal ultrasound. Elevated liver function tests. As per primary team 
Plan: As above. Will check PTH, a renal ultrasound to determine renal size, serum complements and hepatitis panel. We will quantitate proteinuria. He was dialyzed today. He may very well need dialysis again tomorrow. Keep him well-hydrated and follow urine output and electrolytes. Add oral sodium bicarbonate 1300 mg 3 times daily. Add sevelamer 800 mg 3 times daily with meals. Thank you for this consult

## 2020-12-11 NOTE — PROGRESS NOTES
Comprehensive Nutrition Assessment Type and Reason for Visit: Initial, Positive nutrition screen(Low BMI) Nutrition Recommendations/Plan:  
Advance diet as medically able to goal of GI soft, bland diet Adding Ensure Clear TID (720kcals, 24g protein) RD to adjust supplements as needed Allow snacks as desired Food preferences added GI consult for medication management of GERD Please document % of all meal/snack consumed Nutrition Assessment:  Admitted to ICU NSTEMI and  SHAWNA with matabolic acidosis. +Hypoxia on admit requiring NRB, now on RA. Temp HD cath placed today for electrolyte abnormalities. Interviewed at bed side, pt endorses good appetite and that he wants to eat however is hesitant d/t hx of N/V with GERD. No PO yet inpatient d/t NPO status. Pt agreeable to ONS, stated he would take anything that works. Ensure Clear may be best for pt as it seems higher fat/ \"heavier\" foods may trigger worsened GERD, will also add preference for bland foods and adjust as needed once GI medications added. Labs: H/H 10.2/29.9, , Cr 13.8, BG 53-85, AST 1166, , Mg 2.5, Phos 9.1, (at admit) lipase 469. Meds: NS at 125ml/hr, B9, heparin, MVI, Nitroglycerin, antiemetics, PI, zosyn, PRN miralax, renvela, B1. Malnutrition Assessment: 
Malnutrition Status:  Severe malnutrition Context:  Chronic illness Findings of the 6 clinical characteristics of malnutrition:  
Energy Intake:  7 - 75% or less est energy requirements for 1 month or longer Weight Loss:  7.000 - Greater than 20% over 1 year Body Fat Loss:  1 - Mild body fat loss, Triceps, Fat overlying ribs Muscle Mass Loss:  7 - Severe muscle mass loss(moderate- shoulder, temple), Thigh (quadraceps), Calf (gastrocnemius) Fluid Accumulation:  No significant fluid accumulation,   
 
Estimated Daily Nutrient Needs: 
Energy (kcal): 1578kcals (30kcals/kg); Weight Used for Energy Requirements: Current Protein (g): 63kcals (1.2g/kg); Weight Used for Protein Requirements: Current Fluid (ml/day): 1578ml (1ml/kcal); Method Used for Fluid Requirements: 1 ml/kcal 
 
Nutrition Related Findings:  NFPE finding severe muscle and moderate fat wasting. No edema. +N/V, no d/c, no issues c/s however pt edentulous and dentures broken, still claims he can chew with his gums. Wounds:   
None Current Nutrition Therapies: DIET NPO Anthropometric Measures: 
· Height:  5' 10\" (177.8 cm) · Current Body Wt:  52.6 kg (115 lb 15.4 oz)(12/11) · Admission Body Wt:  115 lb 15.4 oz(12/11) · Usual Body Wt:  65.8 kg (145 lb)(per pt 1 year ago) · Ideal Body Wt:  166 lbs:  69.9 % · BMI Category:  Underweight (BMI less than 18.5) Wt hx: 52.6kg (12/11), 56.7kg (12/10) Per UBW, pt with 13.3kg wt loss x1yr (25%, severe) Nutrition Diagnosis:  
· Unintended weight loss related to altered GI function(worsening GERD causing N/V) as evidenced by weight loss greater than or equal to 20% in 1 year Nutrition Interventions:  
Food and/or Nutrient Delivery: Start oral diet, Start oral nutrition supplement(Start PO diet, adding Ensure Clear TID) Nutrition Education and Counseling: No recommendations at this time Coordination of Nutrition Care: Continue to monitor while inpatient, Feeding assistance/environmental change, Swallow evaluation(Add GI medications) Goals: 
Intakes >/=50% of EENs in >7 days Wt gain of 0.5kg in 7 days, lytes wnl Nutrition Monitoring and Evaluation:  
Behavioral-Environmental Outcomes: None identified Food/Nutrient Intake Outcomes: Food and nutrient intake, Supplement intake, Diet advancement/tolerance Physical Signs/Symptoms Outcomes: GI status, Weight, Chewing or swallowing, Nausea/vomiting, Nutrition focused physical findings Discharge Planning: Too soon to determine Electronically signed by Ami Bullard RD on 12/11/2020 at 12:49 PM 
 
Contact: Ext [phone]

## 2020-12-11 NOTE — DIALYSIS
2.5 hour initial hemodialysis on good working new temporary double lumen dialysis cather. Dialysis s\table and weight maintained on run.

## 2020-12-11 NOTE — CONSULTS
Consult NAME: Beth Dong :  1955 MRN:  [id number] Date/Time:  2020 1:27 PM 
 
Patient PCP: Edson Lozano NP 
________________________________________________________________________ Subjective: CHIEF COMPLAINT: Epigastric pain and lower chest pain which is similar like when he had his heart attack. HISTORY OF PRESENT ILLNESS: Over the course of the night I had extensive and multiple times discussion with the ER doctor. Patient presented with presentation of pain in the epigastric area and lower chest and he had EKG concerning for non-Q wave myocardial infarction. He apparently stated that when he had his heart attack this was a similar presentation though this time it was somewhat milder. He did complain of shortness of breath and fatigue. Denied any nausea or vomiting. He has been found in acute renal failure. Past Medical History:  
Diagnosis Date  CAD (coronary artery disease)  GERD (gastroesophageal reflux disease)  Hypertension Patient has had a stent insertion at [facility] in 2019. Past Surgical History:  
Procedure Laterality Date  IR INSERT NON TUNL CVC OVER 5 YRS  2020 No Known Allergies Meds:  See below Social History Tobacco Use  Smoking status: Former Smoker  Smokeless tobacco: Never Used Substance Use Topics  Alcohol use: Yes Frequency: 4 or more times a week Comment: Did not drink since . Patient stated that he smoked  about 1/3 pack a day but has quit since the heart attack and he did used to drink and at times heavy but he says he since  he has not had alcohol and no history of drug abuse. Family History Problem Relation Age of Onset  No Known Problems Mother  No Known Problems Father FAMILY HISTORY: Mother  in her 62s and she had a breast cancer and he did not know much about his father.  
 
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 REVIEW OF SYSTEMS:   
 []         Unable to obtain  ROS due to --- 
 [x]         Total of 12 systems reviewed as follows: 
 
Constitutional: negative fever, negative chills, negative weight loss, positive for fatigue Eyes:   negative visual changes ENT:   negative sore throat, tongue or lip swelling Respiratory:  negative cough, remarkable for dyspnea Cards:  Significant for lower chest pain, denies palpitations, lower extremity edema GI:   negative for nausea, vomiting, diarrhea, had epigastric abdominal pain Genitourinary: negative for frequency, dysuria Integument:  negative for rash Hematologic:  negative for easy bruising and gum/nose bleeding Musculoskel: negative for myalgias,  back pain Neurological:  negative for headaches, dizziness, vertigo, weakness Behavl/Psych: negative for feelings of anxiety, depression Pertinent Positives include : 
 
Objective:  
  
Physical Exam: 
 
Last 24hrs VS reviewed since prior progress note. Most recent are: 
 
Visit Vitals BP 91/61 Pulse 92 Temp 98.2 °F (36.8 °C) Resp 18 Ht 5' 10\" (1.778 m) Wt 52.6 kg (115 lb 15.4 oz) SpO2 100% BMI 16.64 kg/m² Intake/Output Summary (Last 24 hours) at 12/11/2020 1327 Last data filed at 12/11/2020 0700 Gross per 24 hour Intake 10 ml Output  Net 10 ml General Appearance: Well developed, well nourished, alert & oriented x 3,  
 no acute distress. Ears/Nose/Mouth/Throat: Pupils equal and round, Hearing grossly normal. 
Neck: Supple. JVP within normal limits. Carotids good upstrokes, with no bruit. Chest: Lungs clear to auscultation bilaterally. Cardiovascular: Regular rate and rhythm, S1S2 normal, no murmur, rubs, gallops. Abdomen: Soft, non-tender, bowel sounds are active. No organomegaly. Extremities: No edema bilaterally. Femoral pulses +2, Distal Pulses +1. Skin: Warm and dry.  
Neuro: CN II-XII grossly intact, Strength and sensation grossly intact. []         Post-cath site without hematoma, bruit, tenderness, or thrill. Distal pulses intact. Data:  
  
Telemetry: Normal sinus rhythm EKG: 
[x]Sinus rhythm, ST-T depression concerning for non-Q wave myocardial infarction. Prior to Admission medications Medication Sig Start Date End Date Taking? Authorizing Provider  
clopidogreL (PLAVIX) 75 mg tab Take  by mouth. Yes Maximo, MD Blaine  
aspirin delayed-release 81 mg tablet Take  by mouth daily. Yes Other, MD Blaine  
losartan (COZAAR) 25 mg tablet Take  by mouth daily. Yes Maximo, MD Blaine  
isosorbide mononitrate ER (IMDUR) 30 mg tablet Take  by mouth daily. Yes Blaine Marsh MD  
omeprazole (PRILOSEC) 20 mg capsule Take 20 mg by mouth daily. Yes Blaine Marsh MD  
nitroglycerin (Nitrostat) 0.4 mg SL tablet 0.4 mg by SubLINGual route every five (5) minutes as needed for Chest Pain. Up to 3 doses. Yes Maximo, MD Blaine  
rosuvastatin (CRESTOR) 40 mg tablet Take 40 mg by mouth nightly. Yes Blaine Marsh MD  
loratadine (CLARITIN) 10 mg tablet Take 10 mg by mouth. Yes Maximo, MD Blaine  
metoprolol tartrate (LOPRESSOR) 25 mg tablet Take 12.5 mg by mouth two (2) times a day. Yes Maximo, MD Blaine  
acetaminophen (Pharbetol) 500 mg tablet Take  by mouth every six (6) hours as needed for Pain. Yes Maximo, MD Blaine  
 
 
Recent Results (from the past 24 hour(s)) CBC WITH AUTOMATED DIFF Collection Time: 12/10/20  6:37 PM  
Result Value Ref Range WBC 7.5 4.1 - 11.1 K/uL  
 RBC 3.79 (L) 4.10 - 5.70 M/uL  
 HGB 12.7 12.1 - 17.0 g/dL HCT 38.5 36.6 - 50.3 % .6 (H) 80.0 - 99.0 FL  
 MCH 33.5 26.0 - 34.0 PG  
 MCHC 33.0 30.0 - 36.5 g/dL  
 RDW 14.0 11.5 - 14.5 % PLATELET 491 616 - 393 K/uL MPV 10.0 8.9 - 12.9 FL  
 NRBC 0.0 0  WBC ABSOLUTE NRBC 0.00 0.00 - 0.01 K/uL NEUTROPHILS 83 (H) 32 - 75 % LYMPHOCYTES 9 (L) 12 - 49 % MONOCYTES 6 5 - 13 %  EOSINOPHILS 1 0 - 7 % BASOPHILS 0 0 - 1 % IMMATURE GRANULOCYTES 1 (H) 0.0 - 0.5 % ABS. NEUTROPHILS 6.4 1.8 - 8.0 K/UL  
 ABS. LYMPHOCYTES 0.7 (L) 0.8 - 3.5 K/UL  
 ABS. MONOCYTES 0.4 0.0 - 1.0 K/UL  
 ABS. EOSINOPHILS 0.0 0.0 - 0.4 K/UL  
 ABS. BASOPHILS 0.0 0.0 - 0.1 K/UL  
 ABS. IMM. GRANS. 0.0 0.00 - 0.04 K/UL  
 DF AUTOMATED METABOLIC PANEL, COMPREHENSIVE Collection Time: 12/10/20  6:37 PM  
Result Value Ref Range Sodium 136 136 - 145 mmol/L Potassium 6.3 (H) 3.5 - 5.1 mmol/L Chloride 109 (H) 97 - 108 mmol/L  
 CO2 11 (LL) 21 - 32 mmol/L Anion gap 16 (H) 5 - 15 mmol/L Glucose 95 65 - 100 mg/dL BUN 87 (H) 6 - 20 mg/dL Creatinine 12.90 (H) 0.70 - 1.30 mg/dL BUN/Creatinine ratio 7 (L) 12 - 20 GFR est AA 5 (L) >60 ml/min/1.73m2 GFR est non-AA 4 (L) >60 ml/min/1.73m2 Calcium 8.4 (L) 8.5 - 10.1 mg/dL Bilirubin, total 0.3 0.2 - 1.0 mg/dL AST (SGOT) 1,159 (H) 15 - 37 U/L  
 ALT (SGPT) 454 (H) 12 - 78 U/L Alk. phosphatase 104 45 - 117 U/L Protein, total 7.8 6.4 - 8.2 g/dL Albumin 2.6 (L) 3.5 - 5.0 g/dL Globulin 5.2 (H) 2.0 - 4.0 g/dL A-G Ratio 0.5 (L) 1.1 - 2.2    
TROPONIN I Collection Time: 12/10/20  6:37 PM  
Result Value Ref Range Troponin-I, Qt. 2.89 (H) <0.05 ng/mL LIPASE Collection Time: 12/10/20  6:37 PM  
Result Value Ref Range Lipase 469 (H) 73 - 393 U/L  
CBC WITH AUTOMATED DIFF Collection Time: 12/10/20  8:30 PM  
Result Value Ref Range WBC 8.4 4.1 - 11.1 K/uL  
 RBC 3.39 (L) 4.10 - 5.70 M/uL  
 HGB 11.2 (L) 12.1 - 17.0 g/dL HCT 34.3 (L) 36.6 - 50.3 % .2 (H) 80.0 - 99.0 FL  
 MCH 33.0 26.0 - 34.0 PG  
 MCHC 32.7 30.0 - 36.5 g/dL  
 RDW 14.2 11.5 - 14.5 % PLATELET 954 737 - 380 K/uL MPV 9.8 8.9 - 12.9 FL  
 NRBC 0.0 0  WBC ABSOLUTE NRBC 0.00 0.00 - 0.01 K/uL NEUTROPHILS 84 (H) 32 - 75 % LYMPHOCYTES 10 (L) 12 - 49 % MONOCYTES 6 5 - 13 % EOSINOPHILS 0 0 - 7 % BASOPHILS 0 0 - 1 %  IMMATURE GRANULOCYTES 0 0.0 - 0.5 % ABS. NEUTROPHILS 7.0 1.8 - 8.0 K/UL  
 ABS. LYMPHOCYTES 0.9 0.8 - 3.5 K/UL  
 ABS. MONOCYTES 0.5 0.0 - 1.0 K/UL  
 ABS. EOSINOPHILS 0.0 0.0 - 0.4 K/UL  
 ABS. BASOPHILS 0.0 0.0 - 0.1 K/UL  
 ABS. IMM. GRANS. 0.0 0.00 - 0.04 K/UL  
 DF AUTOMATED    
BNP Collection Time: 12/10/20  9:11 PM  
Result Value Ref Range NT pro-BNP 20,725 (H) <125 pg/mL LACTIC ACID Collection Time: 12/10/20  9:11 PM  
Result Value Ref Range Lactic acid 7.1 (HH) 0.4 - 2.0 mmol/L MAGNESIUM Collection Time: 12/10/20  9:11 PM  
Result Value Ref Range Magnesium 2.5 (H) 1.6 - 2.4 mg/dL URIC ACID Collection Time: 12/10/20  9:11 PM  
Result Value Ref Range Uric acid 6.6 3.5 - 7.2 mg/dL CK Collection Time: 12/10/20  9:11 PM  
Result Value Ref Range CK Hemolyzed, recollect requested 39 - 308 U/L  
PHOSPHORUS Collection Time: 12/10/20  9:11 PM  
Result Value Ref Range Phosphorus 9.1 (H) 2.6 - 4.7 mg/dL TROPONIN I Collection Time: 12/10/20  9:11 PM  
Result Value Ref Range Troponin-I, Qt. 2.96 (H) <0.05 ng/mL POTASSIUM Collection Time: 12/10/20  9:11 PM  
Result Value Ref Range Potassium 6.4 (H) 3.5 - 5.1 mmol/L  
BLOOD GAS, ARTERIAL Collection Time: 12/10/20  9:35 PM  
Result Value Ref Range pH 7.25 (L) 7.35 - 7.45    
 PCO2 20 (L) 35 - 45 mmHg PO2 271 (H) 75 - 100 mmHg O2  >95 % BICARBONATE 12 (L) 22 - 26 mmol/L  
 BASE DEFICIT 16.9 (H) 0 - 2 mmol/L  
 O2 METHOD NRB mask O2 FLOW RATE 15.0 L/min FIO2 100.0 % Sample source Arterial    
 SITE Left Brachial    
 TAVO'S TEST Positive Critical value read back CALLED TO [facility], [place] RN   
SARS-COV-2 Collection Time: 12/10/20 10:15 PM  
Result Value Ref Range SARS-CoV-2 Please find results under separate order COVID-19 RAPID TEST Collection Time: 12/10/20 10:15 PM  
Result Value Ref Range  Specimen source Nasopharyngeal    
 COVID-19 rapid test Negative (A) Not Detected PTT  
 Collection Time: 12/10/20 10:52 PM  
Result Value Ref Range aPTT >153.0 (HH) 23.0 - 35.7 sec  
 aPTT, therapeutic range   68 - 109 sec MRSA SCREEN - PCR (NASAL) Collection Time: 12/11/20  1:15 AM  
Result Value Ref Range MRSA by PCR, Nasal Not Detected Not Detected PTT Collection Time: 12/11/20  2:30 AM  
Result Value Ref Range aPTT 45.3 (H) 23.0 - 35.7 sec  
 aPTT, therapeutic range   68 - 109 sec METABOLIC PANEL, COMPREHENSIVE Collection Time: 12/11/20  3:16 AM  
Result Value Ref Range Sodium 138 136 - 145 mmol/L Potassium 4.8 3.5 - 5.1 mmol/L Chloride 106 97 - 108 mmol/L  
 CO2 17 (L) 21 - 32 mmol/L Anion gap 15 5 - 15 mmol/L Glucose 53 (L) 65 - 100 mg/dL  (H) 6 - 20 mg/dL Creatinine 13.80 (H) 0.70 - 1.30 mg/dL BUN/Creatinine ratio 7 (L) 12 - 20 GFR est AA 4 (L) >60 ml/min/1.73m2 GFR est non-AA 4 (L) >60 ml/min/1.73m2 Calcium 8.6 8.5 - 10.1 mg/dL Bilirubin, total 0.3 0.2 - 1.0 mg/dL AST (SGOT) 1,166 (H) 15 - 37 U/L  
 ALT (SGPT) 438 (H) 12 - 78 U/L Alk. phosphatase 98 45 - 117 U/L Protein, total 6.9 6.4 - 8.2 g/dL Albumin 2.4 (L) 3.5 - 5.0 g/dL Globulin 4.5 (H) 2.0 - 4.0 g/dL A-G Ratio 0.5 (L) 1.1 - 2.2    
CBC WITH AUTOMATED DIFF Collection Time: 12/11/20  3:16 AM  
Result Value Ref Range WBC 12.1 (H) 4.1 - 11.1 K/uL  
 RBC 3.04 (L) 4.10 - 5.70 M/uL  
 HGB 10.2 (L) 12.1 - 17.0 g/dL HCT 29.9 (L) 36.6 - 50.3 % MCV 98.4 80.0 - 99.0 FL  
 MCH 33.6 26.0 - 34.0 PG  
 MCHC 34.1 30.0 - 36.5 g/dL  
 RDW 13.6 11.5 - 14.5 % PLATELET 697 855 - 106 K/uL MPV 9.7 8.9 - 12.9 FL  
 NEUTROPHILS 89 (H) 32 - 75 % LYMPHOCYTES 4 (L) 12 - 49 % MONOCYTES 7 5 - 13 % EOSINOPHILS 0 0 - 7 % BASOPHILS 0 0 - 1 % IMMATURE GRANULOCYTES 0 0.0 - 0.5 % ABS. NEUTROPHILS 10.8 (H) 1.8 - 8.0 K/UL  
 ABS. LYMPHOCYTES 0.5 (L) 0.8 - 3.5 K/UL  
 ABS. MONOCYTES 0.8 0.0 - 1.0 K/UL  
 ABS. EOSINOPHILS 0.0 0.0 - 0.4 K/UL ABS. BASOPHILS 0.0 0.0 - 0.1 K/UL  
 ABS. IMM. GRANS. 0.1 (H) 0.00 - 0.04 K/UL  
 DF AUTOMATED    
GLUCOSE, POC Collection Time: 12/11/20  7:38 AM  
Result Value Ref Range Glucose (POC) 85 65 - 100 mg/dL Performed by Monisha Triplett W/MICROSCOPIC Collection Time: 12/11/20 10:30 AM  
Result Value Ref Range Color Dark Yellow Appearance Turbid (A) Clear Specific gravity 1.020 1.003 - 1.030    
 pH (UA) 6.0 5.0 - 8.0 Protein 100 (A) Negative mg/dL Glucose Negative Negative mg/dL Ketone Negative Negative mg/dL Bilirubin Negative Negative Blood Large (A) Negative Urobilinogen 0.1 0.1 - 1.0 EU/dL Nitrites Negative Negative Leukocyte Esterase Negative Negative WBC 5-10 0 - 4 /hpf  
 RBC 5-10 0 - 5 /hpf Bacteria Negative Negative /hpf  
ECHO ADULT COMPLETE Collection Time: 12/11/20 11:45 AM  
Result Value Ref Range Pulmonic Regurgitant End Max Velocity 68.80 cm/s AoV PG 2.00 mmHg Aortic Valve Area by Continuity of Peak Velocity 2.59 cm2 IVSd 0.80 0.6 - 1.0 cm LVIDd 3.68 (A) 4.2 - 5.9 cm LVIDs 2.30 cm LVOT d 1.80 cm Pulmonic Regurgitant End Max Velocity 70.10 cm/s LVOT Peak Gradient 2.00 mmHg LVPWd 0.86 0.6 - 1.0 cm  
 LV E' Septal Velocity 6.24 cm/s LV ED Vol A2C 49.80 cm3 BP EF 67.9 55 - 100 % LV ES Vol A2C 12.20 cm3 E/E' septal 6.83   
 LV Ejection Fraction MOD 2C 38 % Pulmonic Regurgitant End Max Velocity 541.00 cm/s Mitral Valve Deceleration Clinton 2,980.00 mm/s2 Mitral Valve Deceleration Clinton 2,980.00 mm/s2 Mitral Valve E Wave Deceleration Time 169.00 ms Mitral Valve Pressure Half-time 64.00 ms MV A Saqib 64.20 cm/s  
 MV E Saqib 42.60 cm/s  
 MVA (PHT) 3.44 cm2  
 MV E/A 0.66 Pulmonic Regurgitant End Max Velocity 154.00 cm/s Pulmonic Valve Systolic Peak Instantaneous Gradient 9.00 mmHg Pulmonic Regurgitant End Max Velocity 93.50 cm/s  Pulmonic Valve Systolic Peak Instantaneous Gradient 3.00 mmHg P Vein A Dur 81.00 ms Pulmonary Vein \"A\" Wave Velocity 22.50 cm/s  
 Est. RA Pressure 3.00 mmHg RVIDd 2.60 cm RVSP 33.00 mmHg Tricuspid Valve Max Velocity 275.00 cm/s Triscuspid Valve Regurgitation Peak Gradient 30.00 mmHg LV Mass AL 85.8 88 - 224 g LV Mass AL Index 51.8 49 - 115 g/m2 JOSSELINE/BSA Pk Saqib 1.6 cm2/m2 LACTIC ACID Collection Time: 12/11/20 12:15 PM  
Result Value Ref Range Lactic acid 0.8 0.4 - 2.0 mmol/L Assessment:  
His presentation is concerning for acute non-Q wave myocardial infarction. History of myocardial infarction and stent insertion in December 2019 at [facility].  Acute renal failure. Dyslipidemia. History of tobacco use. 1.  
  
 
 Plan:  
Patient is getting hemodialysis and I will follow nephrology recommendations. We will consider dual antiplatelet medications and statin etc. and probably will need to consider cardiac catheterization when somewhat stable. At this time during my evaluation his pain has improved remarkably. Thank you. 1.   
 
 [x]        High complexity decision making was performed Elvia Case MD

## 2020-12-11 NOTE — DIALYSIS
Initial hemodialysis on freshly placed dialysis, Postion of new dialysis access verifird by . Hepatitis studies drawn and sent to lab.

## 2020-12-11 NOTE — PROGRESS NOTES
Hospitalist Progress Note Daily Progress Note: 12/11/2020 Subjective: The patient is seen for follow  up.  
25-year-old male was admitted last night with a complaint of abdominal pain in the epigastric area and lower chest pain. Patient was found to have mildly elevated troponins on admission which has increased to 72.4 now. Cardiology has recommended to restart the patient on heparin drip. Patient denies any chest pain at this time. Patient was also found to have acute renal failure and has been started on hemodialysis. Patient also endorses significant alcohol abuse. Medications reviewed Current Facility-Administered Medications Medication Dose Route Frequency  thiamine mononitrate (B-1) tablet 100 mg  100 mg Oral DAILY  folic acid (FOLVITE) tablet 1 mg  1 mg Oral DAILY  multivitamin (ONE A DAY) tablet 1 Tab  1 Tab Oral DAILY  piperacillin-tazobactam (ZOSYN) 3.375 g in 0.9% sodium chloride (MBP/ADV) 100 mL MBP  3.375 g IntraVENous Q12H  sevelamer carbonate (RENVELA) tab 1,600 mg  1,600 mg Oral TID WITH MEALS  sodium bicarbonate tablet 1,300 mg  1,300 mg Oral TID  albumin human 25% (BUMINATE) solution 25 g  25 g IntraVENous ONCE  
 heparin (porcine) 1,000 unit/mL injection 2,500 Units  2,500 Units Hemodialysis PRN  
 atorvastatin (LIPITOR) tablet 40 mg  40 mg Oral QHS  heparin 25,000 units in D5W 250 ml infusion  12-25 Units/kg/hr IntraVENous TITRATE  heparin (porcine) 1,000 unit/mL injection 3,400 Units  60 Units/kg IntraVENous PRN Or  
 heparin (porcine) 1,000 unit/mL injection 1,700 Units  30 Units/kg IntraVENous PRN  
 nitroglycerin (NITROSTAT) tablet 0.4 mg  0.4 mg SubLINGual PRN  
 aspirin delayed-release tablet 81 mg  81 mg Oral DAILY  clopidogreL (PLAVIX) tablet 75 mg  75 mg Oral DAILY  metoprolol tartrate (LOPRESSOR) tablet 25 mg  25 mg Oral BID  pantoprazole (PROTONIX) tablet 40 mg  40 mg Oral DAILY  sodium chloride (NS) flush 5-40 mL  5-40 mL IntraVENous Q8H  
 sodium chloride (NS) flush 5-40 mL  5-40 mL IntraVENous PRN  polyethylene glycol (MIRALAX) packet 17 g  17 g Oral DAILY PRN  promethazine (PHENERGAN) tablet 12.5 mg  12.5 mg Oral Q6H PRN Or  
 ondansetron (ZOFRAN) injection 4 mg  4 mg IntraVENous Q6H PRN  
 albuterol CONCENTRATE 2.5mg/0.5 mL neb soln  1.25 mg Nebulization Q4H PRN  
 0.9% sodium chloride infusion  125 mL/hr IntraVENous CONTINUOUS  
 nicotine (NICODERM CQ) 14 mg/24 hr patch 1 Patch  1 Patch TransDERmal DAILY Review of Systems: A comprehensive review of systems was negative except for that written in the HPI. Objective:  
Physical Exam:  
 
Visit Vitals BP 91/61 Pulse 92 Temp 98.2 °F (36.8 °C) Resp 18 Ht 5' 10\" (1.778 m) Wt 52.6 kg (115 lb 15.4 oz) SpO2 100% BMI 16.64 kg/m² O2 Device: Room air Temp (24hrs), Av.7 °F (37.1 °C), Min:98 °F (36.7 °C), Max:100.8 °F (38.2 °C) No intake/output data recorded.  1901 -  0700 In: 10 Out: - PHYSICAL EXAM: 
Skin: Extremities and face reveal no rashes. HEENT: Sclerae anicteric. Extra-occular muscles are intact. No oral ulcers. No ENT discharge. The neck is supple. Cardiovascular: Regular rate and rhythm. No murmurs, gallops, or rubs. PMI nondisplaced. Carotids without bruits. Respiratory: Comfortable breathing with no accessory muscle use. Clear breath sounds with no wheezes, rales, or rhonchi. GI: Abdomen nondistended, soft, and nontender. Normal active bowel sounds. No enlargement of the liver or spleen. No masses palpable. Rectal: Deferred Musculoskeletal: No pitting edema of the lower legs. Extremities have good range of motion. No costovertebral tenderness. Neurological: Gross memory appears intact. Patient is alert and oriented. Power 5/5, Cranial nerves II-XII intact Psychiatric: Mood appears appropriate with judgement intact. Lymphatic: No cervical or supraclavicular adenopathy. Data Review:  
   
Recent Days: 
Recent Labs 12/11/20 
0316 12/10/20 
2030 12/10/20 
1837 WBC 12.1* 8.4 7.5 HGB 10.2* 11.2* 12.7 HCT 29.9* 34.3* 38.5  354 330 Recent Labs 12/11/20 
0316 12/10/20 
2111 12/10/20 
1837   --  136  
K 4.8 6.4* 6.3*  
  --  109* CO2 17*  --  11* GLU 53*  --  95 *  --  87* CREA 13.80*  --  12.90* CA 8.6  --  8.4* MG  --  2.5*  --   
PHOS  --  9.1*  --   
ALB 2.4*  --  2.6* TBILI 0.3  --  0.3 *  --  454* Recent Labs 12/10/20 
2135 PH 7.25* PCO2 20* PO2 271* HCO3 12* FIO2 100.0 CMP:  
Lab Results Component Value Date/Time Glucose 53 (L) 12/11/2020 03:16 AM  
 Sodium 138 12/11/2020 03:16 AM  
 Potassium 4.8 12/11/2020 03:16 AM  
 Chloride 106 12/11/2020 03:16 AM  
 CO2 17 (L) 12/11/2020 03:16 AM  
  (H) 12/11/2020 03:16 AM  
 Creatinine 13.80 (H) 12/11/2020 03:16 AM  
 Calcium 8.6 12/11/2020 03:16 AM  
 Anion gap 15 12/11/2020 03:16 AM  
 BUN/Creatinine ratio 7 (L) 12/11/2020 03:16 AM  
 Alk. phosphatase 98 12/11/2020 03:16 AM  
 Protein, total 6.9 12/11/2020 03:16 AM  
 Albumin 2.4 (L) 12/11/2020 03:16 AM  
 Globulin 4.5 (H) 12/11/2020 03:16 AM  
 A-G Ratio 0.5 (L) 12/11/2020 03:16 AM  
 
Cardiac markers:  
Lab Results Component Value Date/Time CK Hemolyzed, recollect requested 12/10/2020 09:11 PM  
 
Radiology review:  
 
 
XR CHEST PORT Final Result Impression:   
Successful placement of a nontunneled hemodialysis catheter. The catheter is  
ready for use. IR INSERT NON TUNL CVC OVER 5 YRS Final Result Impression:   
Successful placement of a nontunneled hemodialysis catheter. The catheter is  
ready [facility] use. [facility] Impression:   
Successful placement of a nontunneled hemodialysis catheter. The catheter is  
ready for use. CT ABD PELV WO CONT Final Result IMPRESSION:  
1. No hydronephrosis.   
2. Small nonobstructing left renal upper pole sinus calculus. No ureter  
calculus. 3. Markedly distended gallbladder without other findings potentially reflecting  
cholecystitis. 4. Atherosclerosis. 5. Peripheral ill-defined and nodular densities in the right lower lobe, could  
reflect acute inflammatory disease or infection among other possibilities. XR CHEST PORT Final Result US RETROPERITONEUM COMP    (Results Pending) US ABD LTD    (Results Pending) US ABD COMP    (Results Pending) Assessment/  
 
Problem List: 
Hospital Problems  Never Reviewed Codes Class Noted POA Alcohol abuse ICD-10-CM: F10.10 ICD-9-CM: 305.00  12/11/2020 Unknown Abdominal pain ICD-10-CM: R10.9 ICD-9-CM: 789.00  12/11/2020 Unknown Hyperkalemia ICD-10-CM: E87.5 ICD-9-CM: 276.7  12/11/2020 Yes Sepsis ([facility] [address] ICD-10-CM: A41.9 ICD-9-CM: 038.9, 995.91  12/11/2020 Yes NSTEMI (non-ST elevated myocardial infarction) ([facility] [address] ICD-10-CM: I21.4 ICD-9-CM: 410.70  12/10/2020 Unknown Acute renal failure (ARF) ([facility]) ICD-10-CM: N17.9 ICD-9-CM: 584.9  12/10/2020 Yes Plan: 1. NSTEMI: Continue the patient on heparin drip and aspirin and Lipitor. Hold patient's Plavix as patient will be on heparin drip. Continue patient on Lopressor. Cardiology evaluation of the patient will be appreciated 2. Acute renal failure with hyperkalemia: Patient has been started on hemodialysis as per nephrology. Will monitor patient's renal functions and follow nephrology recommendations. 3.  Abdominal pain with dilated gallbladder on CT scan. General surgery consultation for the patient has been requested will follow up with the recommendations. Follow-up on ultrasound of abdomen. 4.  Alcohol abuse: We will start the patient on thiamine and multivitamins. Watch for any withdrawal symptoms. 5.  Sepsis: Patient has significantly elevated lactic acid level.   Suspicious for sepsis. Patient is empirically started on IV antibiotics. We will follow-up on patient's blood cultures and urine culture. Care Plan discussed with: Patient/Family,  and Consultant surgery.  
 
Lyssa Ramírez MD

## 2020-12-11 NOTE — H&P
History and Physical 
 
   
 
 
 
Subjective :  
Chief Complaint : Generalized weakness, shortness of breath, Hypoxia Source of information : Mostly from ED Provider,, Old medical records. Unable to get much information from patient. History of present illness:  
76 y.o. male with history of coronary artery disease and stent placement in December 2019 with no significant medical problems before then presents to the emergency room complaining of not feeling good with generalized weakness. Complains of epigastric area burning and tightness. He was taking some Tums with no improvement. The pain is radiating into the chest.  Worse with exertion, associated with shortness of breath. No fever or chills. States feeling very tired, unable to ambulate. He was found tripoding when EMS arrived at the scene, and since then he was having difficult time to breathe. He is over the significant hypoxia started on nonrebreather, an ABG oxygenation is good but he is blowing out CO2 likely due to metabolic acidosis. Past Medical History:  
Diagnosis Date  CAD (coronary artery disease)  GERD (gastroesophageal reflux disease)  Hypertension Surgical history : Stent placement. Social history: Quit smoking, alcohol recently stopped. States he wished he had some. No substance abuse. Family history: Denies any significant medical problems in the family but he is not much aware of their history. Prior to Admission medications Medication Sig Start Date End Date Taking? Authorizing Provider  
clopidogreL (PLAVIX) 75 mg tab Take  by mouth. Yes Maximo, MD Blaine  
aspirin delayed-release 81 mg tablet Take  by mouth daily. Yes Maximo, MD Blaine  
losartan (COZAAR) 25 mg tablet Take  by mouth daily. Yes Blaine Marsh MD  
isosorbide mononitrate ER (IMDUR) 30 mg tablet Take  by mouth daily. Yes Maximo, MD Blaine  
omeprazole (PRILOSEC) 20 mg capsule Take 20 mg by mouth daily.    Yes Blaine Marsh MD nitroglycerin (Nitrostat) 0.4 mg SL tablet 0.4 mg by SubLINGual route every five (5) minutes as needed for Chest Pain. Up to 3 doses. Yes Maximo, MD Blaine  
rosuvastatin (CRESTOR) 40 mg tablet Take 40 mg by mouth nightly. Yes Maximo, MD Blaine  
loratadine (CLARITIN) 10 mg tablet Take 10 mg by mouth. Yes Maximo, MD Blaine  
metoprolol tartrate (LOPRESSOR) 25 mg tablet Take 12.5 mg by mouth two (2) times a day. Yes Maximo, MD Blaine  
acetaminophen (Pharbetol) 500 mg tablet Take  by mouth every six (6) hours as needed for Pain. Yes Other, MD Blaine  
 
No Known Allergies Review of Systems: Unable to obtain much information as patient is in significant respiratory distress and tired unable to speak. Vitals:  
 
Patient Vitals for the past 12 hrs: 
 Temp Pulse Resp BP SpO2  
12/10/20 2203     93 % 12/10/20 2114    97/76   
12/10/20 2110  (!) 101 24 (!) 85/70   
12/10/20 2052  100  (!) 141/113   
12/10/20 1859  (!) 107 17 (!) 141/99   
12/10/20 1736 98.6 °F (37 °C) 98 18 (!) 154/105  Physical Exam:  
General : Chronically ill looking, very tired and weak. HEENT : PERRLA, dry  oral mucosa, atraumatic normocephalic, Normal ear and nose. Neck : Supple, no JVD, no masses noted, no carotid bruit. Lungs : Breath sounds with moderate air entry bilaterally, no wheezes or rales, no accessory muscle use. CVS : Rhythm rate regular, S1+, S2+, no murmur or gallop. Abdomen : Soft, nontender, no organomegaly, bowel sounds active. Extremities : No edema noted,  pedal pulses palpable. Musculoskeletal : Fair range of motion, poor muscle mass with decreased tone and power. Skin : Very dry with poor skin turgor. Lymphatic : No cervical lymphadenopathy. Neurological : Awake, alert, oriented to place person. Psychiatric : Mood and affect appears very slow. Data Review:  
Recent Results (from the past 24 hour(s)) CBC WITH AUTOMATED DIFF  Collection Time: 12/10/20  6:37 PM Result Value Ref Range WBC 7.5 4.1 - 11.1 K/uL  
 RBC 3.79 (L) 4.10 - 5.70 M/uL  
 HGB 12.7 12.1 - 17.0 g/dL HCT 38.5 36.6 - 50.3 % .6 (H) 80.0 - 99.0 FL  
 MCH 33.5 26.0 - 34.0 PG  
 MCHC 33.0 30.0 - 36.5 g/dL  
 RDW 14.0 11.5 - 14.5 % PLATELET 437 500 - 270 K/uL MPV 10.0 8.9 - 12.9 FL  
 NRBC 0.0 0  WBC ABSOLUTE NRBC 0.00 0.00 - 0.01 K/uL NEUTROPHILS 83 (H) 32 - 75 % LYMPHOCYTES 9 (L) 12 - 49 % MONOCYTES 6 5 - 13 % EOSINOPHILS 1 0 - 7 % BASOPHILS 0 0 - 1 % IMMATURE GRANULOCYTES 1 (H) 0.0 - 0.5 % ABS. NEUTROPHILS 6.4 1.8 - 8.0 K/UL  
 ABS. LYMPHOCYTES 0.7 (L) 0.8 - 3.5 K/UL  
 ABS. MONOCYTES 0.4 0.0 - 1.0 K/UL  
 ABS. EOSINOPHILS 0.0 0.0 - 0.4 K/UL  
 ABS. BASOPHILS 0.0 0.0 - 0.1 K/UL  
 ABS. IMM. GRANS. 0.0 0.00 - 0.04 K/UL  
 DF AUTOMATED METABOLIC PANEL, COMPREHENSIVE Collection Time: 12/10/20  6:37 PM  
Result Value Ref Range Sodium 136 136 - 145 mmol/L Potassium 6.3 (H) 3.5 - 5.1 mmol/L Chloride 109 (H) 97 - 108 mmol/L  
 CO2 11 (LL) 21 - 32 mmol/L Anion gap 16 (H) 5 - 15 mmol/L Glucose 95 65 - 100 mg/dL BUN 87 (H) 6 - 20 mg/dL Creatinine 12.90 (H) 0.70 - 1.30 mg/dL BUN/Creatinine ratio 7 (L) 12 - 20 GFR est AA 5 (L) >60 ml/min/1.73m2 GFR est non-AA 4 (L) >60 ml/min/1.73m2 Calcium 8.4 (L) 8.5 - 10.1 mg/dL Bilirubin, total 0.3 0.2 - 1.0 mg/dL AST (SGOT) 1,159 (H) 15 - 37 U/L  
 ALT (SGPT) 454 (H) 12 - 78 U/L Alk. phosphatase 104 45 - 117 U/L Protein, total 7.8 6.4 - 8.2 g/dL Albumin 2.6 (L) 3.5 - 5.0 g/dL Globulin 5.2 (H) 2.0 - 4.0 g/dL A-G Ratio 0.5 (L) 1.1 - 2.2    
TROPONIN I Collection Time: 12/10/20  6:37 PM  
Result Value Ref Range Troponin-I, Qt. 2.89 (H) <0.05 ng/mL LIPASE Collection Time: 12/10/20  6:37 PM  
Result Value Ref Range Lipase 469 (H) 73 - 393 U/L  
CBC WITH AUTOMATED DIFF Collection Time: 12/10/20  8:30 PM  
Result Value Ref Range  WBC 8.4 4.1 - 11.1 K/uL RBC 3.39 (L) 4.10 - 5.70 M/uL  
 HGB 11.2 (L) 12.1 - 17.0 g/dL HCT 34.3 (L) 36.6 - 50.3 % .2 (H) 80.0 - 99.0 FL  
 MCH 33.0 26.0 - 34.0 PG  
 MCHC 32.7 30.0 - 36.5 g/dL  
 RDW 14.2 11.5 - 14.5 % PLATELET 554 550 - 623 K/uL MPV 9.8 8.9 - 12.9 FL  
 NRBC 0.0 0  WBC ABSOLUTE NRBC 0.00 0.00 - 0.01 K/uL NEUTROPHILS 84 (H) 32 - 75 % LYMPHOCYTES 10 (L) 12 - 49 % MONOCYTES 6 5 - 13 % EOSINOPHILS 0 0 - 7 % BASOPHILS 0 0 - 1 % IMMATURE GRANULOCYTES 0 0.0 - 0.5 % ABS. NEUTROPHILS 7.0 1.8 - 8.0 K/UL  
 ABS. LYMPHOCYTES 0.9 0.8 - 3.5 K/UL  
 ABS. MONOCYTES 0.5 0.0 - 1.0 K/UL  
 ABS. EOSINOPHILS 0.0 0.0 - 0.4 K/UL  
 ABS. BASOPHILS 0.0 0.0 - 0.1 K/UL  
 ABS. IMM. GRANS. 0.0 0.00 - 0.04 K/UL  
 DF AUTOMATED    
BNP Collection Time: 12/10/20  9:11 PM  
Result Value Ref Range NT pro-BNP 20,725 (H) <125 pg/mL LACTIC ACID Collection Time: 12/10/20  9:11 PM  
Result Value Ref Range Lactic acid 7.1 (HH) 0.4 - 2.0 mmol/L  
BLOOD GAS, ARTERIAL Collection Time: 12/10/20  9:35 PM  
Result Value Ref Range pH 7.25 (L) 7.35 - 7.45    
 PCO2 20 (L) 35 - 45 mmHg PO2 271 (H) 75 - 100 mmHg O2  >95 % BICARBONATE 12 (L) 22 - 26 mmol/L  
 BASE DEFICIT 16.9 (H) 0 - 2 mmol/L  
 O2 METHOD NRB mask O2 FLOW RATE 15.0 L/min FIO2 100.0 % Sample source Arterial    
 SITE Left Brachial    
 TAVO'S TEST Positive Critical value read back CALLED TO [facility] RN   
SARS-COV-2 Collection Time: 12/10/20 10:15 PM  
Result Value Ref Range SARS-CoV-2 Please find results under separate order Radiologic Studies :  
CT abdomen/pelvis: IMPRESSION: 
1. No hydronephrosis. 2. Small nonobstructing left renal upper pole sinus calculus. No ureter 
calculus. 3. Markedly distended gallbladder without other findings potentially reflecting 
cholecystitis. 4. Atherosclerosis.  
5. Peripheral ill-defined and nodular densities in the right lower lobe, could reflect acute inflammatory disease or infection among other possibilities. CXR Results  (Last 48 hours) 12/10/20 1815  XR CHEST PORT Final result Narrative:  Chest single view. Comparison single view chest December 5, 2019. Lungs clear. No interstitial or alveolar pulmonary edema. Cardiac and  
mediastinal structures unchanged. No pneumothorax or pleural effusion. Nonacute  
left rib fractures. Assessment and Plan :  
Acute renal failure with severe metabolic acidosis: Etiology unclear. He has completely normal renal functions last year in December. Unable to find out any information recently happened. But it looks like severe acute renal failure. No signs of obstruction on CT scan. On Vega catheter placement did not get much urine. ED provider already discussed with the nephrologist on-call. Severe dehydration: Etiology likely poor oral intake. Given 2 L of fluid bolus, we will continue maintenance fluid. Non-ST elevation MI: Will order for repeat troponin, consultation placed to cardiology and will follow with recommendations. Severe lactic acidosis: Secondary to tissue hypoxia with poor perfusion. History of coronary artery disease with stent placement LAD. On Plavix Benign essential hypertension: On medications which I am going to hold due to his hypotension, restart as condition dictates Admitted to ICU, full CODE STATUS, home medications verified with bottles patient brought. Total critical care time spent for this patient admission is 40 minutes. CC : Janneth Arcos NP Signed By: Cale Beck MD   
 December 10, 2020 This dictation was done by dragon, computer voice recognition software. Often unanticipated grammatical, syntax, [place] phones and other interpretive errors are inadvertently transcribed. Please excuse errors that have escaped final proofreading.

## 2020-12-12 PROBLEM — D62 ANEMIA ASSOCIATED WITH ACUTE BLOOD LOSS: Status: ACTIVE | Noted: 2020-01-01

## 2020-12-12 NOTE — ROUTINE PROCESS
Right trialysis site continues to ooze, dressing not intact with skin, redressed with quick clot directly over insertion site and reinforced with 4x4 gauze and tegaderm. Will continue to monitor.

## 2020-12-12 NOTE — PROGRESS NOTES
Progress Note 12/12/2020 3:07 PM 
NAME: Derrick Larson MRN:  [id number] Admit Diagnosis: NSTEMI (non-ST elevated myocardial infarction) ([facility] [I21.4] Acute renal failure (ARF) ([facility]) [N17.9] Subjective:  
Chart reviewed. Discussed with medical doctor and with the nurse. Patient denies any chest pain. Denies any epigastric discomfort. Has a serious drop of hemoglobin and nurse mentions that he has been constantly oozing blood from insertion site for the dialysis catheter. Had a dialysis yesterday. Review of Systems: 
 
Symptom Y/N Comments  Symptom Y/N Comments Fever/Chills n   Chest Pain n   
Poor Appetite    Edema Cough n   Abdominal Pain n   
Sputum    Joint Pain SOB/STERN n   Pruritis/Rash Nausea/vomit n   Other Diarrhea Constipation Could NOT obtain due to:   
 
Objective:  
  
 
 
Physical Exam: 
 
Last 24hrs VS reviewed since prior progress note. Most recent are: 
 
Visit Vitals BP (!) 93/58 (BP Patient Position: At rest) Pulse 88 Temp 98.8 °F (37.1 °C) Resp 20 Ht 5' 10\" (1.778 m) Wt 52.6 kg (115 lb 15.4 oz) SpO2 96% BMI 16.64 kg/m² Intake/Output Summary (Last 24 hours) at 12/12/2020 1507 Last data filed at 12/12/2020 0600 Gross per 24 hour Intake  Output 30 ml Net -30 ml General Appearance: Well developed, well nourished, alert & oriented x 3,  
 no acute distress. Ears/Nose/Mouth/Throat: Hearing grossly normal. 
Neck: Supple. Chest: Lungs clear to auscultation bilaterally. Cardiovascular: Regular rate and rhythm, S1,S2 normal, no murmur. Abdomen: Soft, non-tender, bowel sounds are active. Extremities: No edema bilaterally. Skin: Warm and dry. []         Post-cath site without hematoma, bruit, tenderness, or thrill. Distal pulses intact. PMH/SH reviewed - no change compared to H&P Data Review Telemetry: normal sinus rhythm EKG:  
[]   
 
Lab Data Personally Reviewed: 
 
Recent Labs 12/12/20 
1135 12/12/20 
0500 12/11/20 0316 WBC  --  13.2* 12.1* HGB 7.3* 7.7* 10.2* HCT 21.1* 23.2* 29.9*  
PLT  --  261 332 Recent Labs 12/12/20 
0500 12/11/20 2000 12/11/20 
1215 APTT 71.1* 112.6* 40.6* Recent Labs 12/12/20 
0500 12/11/20 
0316 12/10/20 
2111 12/10/20 
1837  138  --  136  
K 3.6 4.8 6.4* 6.3*  
 106  --  109* CO2 23 17*  --  11* BUN 50* 100*  --  87* CREA 8.57* 13.80*  --  12.90* GLU 89 53*  --  95  
CA 8.2* 8.6  --  8.4* MG  --   --  2.5*  --   
 
Recent Labs 12/12/20 
0500 12/11/20 
1215 12/10/20 
2111 CPK  --   --  Hemolyzed, recollect requested TROIQ 59.60* 72.40* 2.96* No results found for: CHOL, CHOLX, CHLST, CHOLV, HDL, HDLP, LDL, LDLC, DLDLP, TGLX, TRIGL, TRIGP, CHHD, CHHDX Recent Labs 12/12/20 
0500 12/11/20 
0316 12/10/20 
1837 AP 81 98 104  
TP 5.9* 6.9 7.8 ALB 2.2* 2.4* 2.6*  
GLOB 3.7 4.5* 5.2*  
LPSE  --   --  469* Recent Labs 12/10/20 
2135 PH 7.25* PCO2 20* PO2 271* Medications Personally Reviewed: 
 
Current Facility-Administered Medications Medication Dose Route Frequency  0.9% sodium chloride infusion 250 mL  250 mL IntraVENous PRN  thiamine mononitrate (B-1) tablet 100 mg  100 mg Oral DAILY  folic acid (FOLVITE) tablet 1 mg  1 mg Oral DAILY  multivitamin (ONE A DAY) tablet 1 Tab  1 Tab Oral DAILY  piperacillin-tazobactam (ZOSYN) 3.375 g in 0.9% sodium chloride (MBP/ADV) 100 mL MBP  3.375 g IntraVENous Q12H  sevelamer carbonate (RENVELA) tab 1,600 mg  1,600 mg Oral TID WITH MEALS  sodium bicarbonate tablet 1,300 mg  1,300 mg Oral TID  heparin (porcine) 1,000 unit/mL injection 2,500 Units  2,500 Units Hemodialysis PRN  
 atorvastatin (LIPITOR) tablet 40 mg  40 mg Oral QHS  heparin 25,000 units in D5W 250 ml infusion  12-25 Units/kg/hr IntraVENous TITRATE  heparin (porcine) 1,000 unit/mL injection 3,400 Units  60 Units/kg IntraVENous PRN  Or  
 heparin (porcine) 1,000 unit/mL injection 1,700 Units  30 Units/kg IntraVENous PRN  
 nitroglycerin (NITROSTAT) tablet 0.4 mg  0.4 mg SubLINGual PRN  
 aspirin delayed-release tablet 81 mg  81 mg Oral DAILY  [Held by provider] clopidogreL (PLAVIX) tablet 75 mg  75 mg Oral DAILY  metoprolol tartrate (LOPRESSOR) tablet 25 mg  25 mg Oral BID  pantoprazole (PROTONIX) tablet 40 mg  40 mg Oral DAILY  sodium chloride (NS) flush 5-40 mL  5-40 mL IntraVENous Q8H  
 sodium chloride (NS) flush 5-40 mL  5-40 mL IntraVENous PRN  polyethylene glycol (MIRALAX) packet 17 g  17 g Oral DAILY PRN  promethazine (PHENERGAN) tablet 12.5 mg  12.5 mg Oral Q6H PRN Or  
 ondansetron (ZOFRAN) injection 4 mg  4 mg IntraVENous Q6H PRN  
 albuterol CONCENTRATE 2.5mg/0.5 mL neb soln  1.25 mg Nebulization Q4H PRN  
 0.9% sodium chloride infusion  125 mL/hr IntraVENous CONTINUOUS  
 nicotine (NICODERM CQ) 14 mg/24 hr patch 1 Patch  1 Patch TransDERmal DAILY Problem List:  
Non-Q wave myocardial infarction. Patient has a serious drop of hemoglobin and it could be from puncture of subclavian for dialysis catheter but he could have a GI bleed also. Acute renal failure and patient had a dialysis yesterday. 1.   
 
Assessment/Plan:  
Because of her acute renal failure and serious drop of hemoglobin I will probably not rush him to cardiac Cath Lab until his hemoglobin is somewhat stable. May be reasonable to consider GI evaluation also. Discussed with the medical doctor and with the nurse and probably will get upright sitting chest x-ray or even may need to check a CAT scan to make sure that he is not losing blood in the chest cavity also. Thank you. 1.   
 
  
 [x]       High complexity decision making was performed in this patient at high risk for decompensation with multiple organ involvement.  
 
Roger Colbert MD

## 2020-12-12 NOTE — ROUTINE PROCESS
Dr. Екатерина Moore at bedside, repeat H&H resulted and 1unit prbc's ordered as well ad upright chest xray. Will continue to monitor.

## 2020-12-12 NOTE — PROGRESS NOTES
Hospitalist Progress Note Daily Progress Note: 12/12/2020 Subjective: The patient is seen for follow  up.  
77-year-old male was admitted last night with a complaint of abdominal pain in the epigastric area and lower chest pain. Patient was found to have mildly elevated troponins on admission which has increased to 72.4 now. Cardiology has recommended to restart the patient on heparin drip. Patient denies any chest pain at this time. Patient was also found to have acute renal failure and has been started on hemodialysis. Patient also endorses significant alcohol abuse. Patient had elevation in troponins and was started on heparin drip. He has been noted to have oozing of blood from dialysis catheter site. This was pressure dressed. H&H drop also noticed. Patient had only 30ml of urine output. Hematuria noticed on that. Medications reviewed Current Facility-Administered Medications Medication Dose Route Frequency  thiamine mononitrate (B-1) tablet 100 mg  100 mg Oral DAILY  folic acid (FOLVITE) tablet 1 mg  1 mg Oral DAILY  multivitamin (ONE A DAY) tablet 1 Tab  1 Tab Oral DAILY  piperacillin-tazobactam (ZOSYN) 3.375 g in 0.9% sodium chloride (MBP/ADV) 100 mL MBP  3.375 g IntraVENous Q12H  sevelamer carbonate (RENVELA) tab 1,600 mg  1,600 mg Oral TID WITH MEALS  sodium bicarbonate tablet 1,300 mg  1,300 mg Oral TID  heparin (porcine) 1,000 unit/mL injection 2,500 Units  2,500 Units Hemodialysis PRN  
 atorvastatin (LIPITOR) tablet 40 mg  40 mg Oral QHS  heparin 25,000 units in D5W 250 ml infusion  12-25 Units/kg/hr IntraVENous TITRATE  heparin (porcine) 1,000 unit/mL injection 3,400 Units  60 Units/kg IntraVENous PRN Or  
 heparin (porcine) 1,000 unit/mL injection 1,700 Units  30 Units/kg IntraVENous PRN  
 nitroglycerin (NITROSTAT) tablet 0.4 mg  0.4 mg SubLINGual PRN  
 aspirin delayed-release tablet 81 mg  81 mg Oral DAILY  [Held by provider] clopidogreL (PLAVIX) tablet 75 mg  75 mg Oral DAILY  metoprolol tartrate (LOPRESSOR) tablet 25 mg  25 mg Oral BID  pantoprazole (PROTONIX) tablet 40 mg  40 mg Oral DAILY  sodium chloride (NS) flush 5-40 mL  5-40 mL IntraVENous Q8H  
 sodium chloride (NS) flush 5-40 mL  5-40 mL IntraVENous PRN  polyethylene glycol (MIRALAX) packet 17 g  17 g Oral DAILY PRN  promethazine (PHENERGAN) tablet 12.5 mg  12.5 mg Oral Q6H PRN Or  
 ondansetron (ZOFRAN) injection 4 mg  4 mg IntraVENous Q6H PRN  
 albuterol CONCENTRATE 2.5mg/0.5 mL neb soln  1.25 mg Nebulization Q4H PRN  
 0.9% sodium chloride infusion  125 mL/hr IntraVENous CONTINUOUS  
 nicotine (NICODERM CQ) 14 mg/24 hr patch 1 Patch  1 Patch TransDERmal DAILY Review of Systems: A comprehensive review of systems was negative except for that written in the HPI. Objective:  
Physical Exam:  
 
Visit Vitals BP 95/65 (BP 1 Location: Right arm, BP Patient Position: At rest) Pulse 96 Temp 99.4 °F (37.4 °C) Resp 24 Ht 5' 10\" (1.778 m) Wt 52.6 kg (115 lb 15.4 oz) SpO2 100% BMI 16.64 kg/m² O2 Device: Room air Temp (24hrs), Av.5 °F (36.9 °C), Min:98 °F (36.7 °C), Max:99.4 °F (37.4 °C) No intake/output data recorded. 12/10 1901 -  0700 In: 10 Out: -79 [Urine:30] PHYSICAL EXAM: 
General: alert and awake, not in distress Skin: Extremities and face reveal no rashes. HEENT: Sclerae anicteric. Extra-occular muscles are intact. No oral ulcers. No ENT discharge. The neck is supple. Oozing of blood around the dialysis catheter noted. Cardiovascular: Regular rate and rhythm. No murmurs, gallops, or rubs. PMI nondisplaced. Carotids without bruits. Respiratory: Comfortable breathing with no accessory muscle use. Clear breath sounds with no wheezes, rales, or rhonchi. GI: Abdomen nondistended, soft, and nontender. Normal active bowel sounds. No enlargement of the liver or spleen. No masses palpable.   
Rectal: Deferred Musculoskeletal: No pitting edema of the lower legs. Extremities have good range of motion. No costovertebral tenderness. Neurological: Gross memory appears intact. Patient is alert and oriented. Power 5/5, Cranial nerves II-XII intact Psychiatric: Mood appears appropriate with judgement intact. Lymphatic: No cervical or supraclavicular adenopathy. Data Review:  
   
Recent Days: 
Recent Labs 12/12/20 
0500 12/11/20 
0316 12/10/20 
2030 WBC 13.2* 12.1* 8.4 HGB 7.7* 10.2* 11.2* HCT 23.2* 29.9* 34.3*  
 332 354 Recent Labs 12/12/20 
0500 12/11/20 
0316 12/10/20 
2111 12/10/20 
1837  138  --  136  
K 3.6 4.8 6.4* 6.3*  
 106  --  109* CO2 23 17*  --  11* GLU 89 53*  --  95  
BUN 50* 100*  --  87* CREA 8.57* 13.80*  --  12.90* CA 8.2* 8.6  --  8.4* MG  --   --  2.5*  --   
PHOS  --   --  9.1*  --   
ALB 2.2* 2.4*  --  2.6* TBILI 0.3 0.3  --  0.3 * 438*  --  454* Recent Labs 12/10/20 
2135 PH 7.25* PCO2 20* PO2 271* HCO3 12* FIO2 100.0 CMP:  
Lab Results Component Value Date/Time Glucose 89 12/12/2020 05:00 AM  
 Sodium 138 12/12/2020 05:00 AM  
 Potassium 3.6 12/12/2020 05:00 AM  
 Chloride 103 12/12/2020 05:00 AM  
 CO2 23 12/12/2020 05:00 AM  
 BUN 50 (H) 12/12/2020 05:00 AM  
 Creatinine 8.57 (H) 12/12/2020 05:00 AM  
 Calcium 8.2 (L) 12/12/2020 05:00 AM  
 Anion gap 12 12/12/2020 05:00 AM  
 BUN/Creatinine ratio 6 (L) 12/12/2020 05:00 AM  
 Alk. phosphatase 81 12/12/2020 05:00 AM  
 Protein, total 5.9 (L) 12/12/2020 05:00 AM  
 Albumin 2.2 (L) 12/12/2020 05:00 AM  
 Globulin 3.7 12/12/2020 05:00 AM  
 A-G Ratio 0.6 (L) 12/12/2020 05:00 AM  
 
Cardiac markers:  
Lab Results Component Value Date/Time CK Hemolyzed, recollect requested 12/10/2020 09:11 PM  
 
Radiology review:  
 
 
US ABD COMP Final Result Impression: No ultrasound evidence for infectious/inflammatory process involving  
the gallbladder. XR CHEST PORT Final Result Impression:   
Successful placement of a nontunneled hemodialysis catheter. The catheter is  
ready for use. IR INSERT NON TUNL CVC OVER 5 YRS Final Result Impression:   
Successful placement of a nontunneled hemodialysis catheter. The catheter is  
ready for use. IR [facility] Final Result Impression:   
Successful placement of a nontunneled hemodialysis catheter. The catheter is  
ready for use. CT ABD PELV WO CONT Final Result IMPRESSION:  
1. No hydronephrosis. 2. Small nonobstructing left renal upper pole sinus calculus. No ureter  
calculus. 3. Markedly distended gallbladder without other findings potentially reflecting  
cholecystitis. 4. Atherosclerosis. 5. Peripheral ill-defined and nodular densities in the right lower lobe, could  
reflect acute inflammatory disease or infection among other possibilities. XR CHEST PORT Final Result US RETROPERITONEUM COMP    (Results Pending) US ABD LTD    (Results Pending) Assessment/  
 
Problem List: 
Hospital Problems  Never Reviewed Codes Class Noted POA Alcohol abuse ICD-10-CM: F10.10 ICD-9-CM: 305.00  12/11/2020 Unknown Abdominal pain ICD-10-CM: R10.9 ICD-9-CM: 789.00  12/11/2020 Unknown Hyperkalemia ICD-10-CM: E87.5 ICD-9-CM: 276.7  12/11/2020 Yes Sepsis ([facility] ICD-10-CM: A41.9 ICD-9-CM: 038.9, 995.91  12/11/2020 Yes NSTEMI (non-ST elevated myocardial infarction) ([facility] ICD-10-CM: I21.4 ICD-9-CM: 410.70  12/10/2020 Unknown Acute renal failure (ARF) ([facility]) ICD-10-CM: N17.9 ICD-9-CM: 584.9  12/10/2020 Yes Plan: 1. NSTEMI: Continue the patient on heparin drip and aspirin and Lipitor. Hold patient's Plavix as patient will be on heparin drip. Continue patient on Lopressor. Cardiology evaluation of the patient will be appreciated Troponin trending down Patient noticed to have acute blood lose- Will see how he does with pressure dressing. 2. Anemia, acute blood lose- Pressure dressing of the dialysis catheter- will repeat H&H. If futrther drop noticed will d/c Heparin drip, if OK with cardiology. Consider transfusion  Also if further drop noticed. 3.  Acute renal failure with hyperkalemia: Patient has been started on hemodialysis as per nephrology. Will monitor patient's renal functions and follow nephrology recommendations. 3.  Abdominal pain with dilated gallbladder on CT scan. USG abd does not show signs of cholecystitis. Follow with surgery. Gall Bladder distension likely from poor oral intake. 4.  Alcohol abuse: We will continue the patient on thiamine and multivitamins. Watch for any withdrawal symptoms. 5.  Sepsis: Patient has significantly elevated lactic acid level. Suspicious for sepsis. Patient is empirically started on IV antibiotics. We will follow-up on patient's blood cultures and urine culture. 7. Metabolic acidosis- due to renal failure- improved Critical care time 40 mins. Care Plan discussed with: Patient/Family,  and Consultant surgery.  
 
Roger Hamilton MD

## 2020-12-12 NOTE — PROGRESS NOTES
Attempted dressing change to trialysis catheter, moderate amount of blood present, large clot to insertion site. 4x4 reinforced and pressure dressing applied. Pt made aware of possible need for surgical  Intervention in AM to inhibit bleeding. Will continue to monitor. Charge nurse made aware.

## 2020-12-12 NOTE — ROUTINE PROCESS
Dressing to trialysis site again oozing blood from beneath dressing, reinforced with 4x4 and pressure dressing, will contact Dr. Glenn Farias regarding heparin drip. Will continue to monitor.

## 2020-12-12 NOTE — ROUTINE PROCESS
Dressing to trialysis site noted to be saturated with blood and oozing from beneath tegaderm. Nurse reinforced with 4x4 and tegaderm, pressure dsg. Dr. Franck Alex paged to make aware and ask about heparin drip. Will continue to monitor.

## 2020-12-12 NOTE — PROGRESS NOTES
Pt trialysis catheter leaking blood, tegaderm changed,  redressed and reinforced with quick clot placed under 4x4. Will continue to monitor.

## 2020-12-12 NOTE — CONSULTS
Gastroenterology Consult Patient: April Inch MRN: [id number]  SSN: [id number] YOB: 1955  Age: 76 y.o. Sex: male Assessment: 1. Acute anemia 
-Cause of this drop in H&H is most likely related to blood loss coming from the hemodialysis catheter site. Patient has a coagulopathy secondary to heparin and is also losing blood in the urine and possibly in the tract. Patient has a history of EtOH abuse and related gastritis may also be a contributing factor particularly when he is hypercoagulated. 2.  Non-STEMI MI 
3. Acute renal failure 4. Hyperkalemia 5. Chronic EtOH abuse 6. Elevated LFTs 
-Possible secondary to his EtOH abuse and suspected sepsis Plan: 1. Closely monitor H&H and stools of blood. 2.  Place patient on daily PPI therapy. 3.  Since patient did have  an acute MI, no invasive endoscopic procedure is advised at this time. 4.   Further evaluation may be done once patient has been cleared from a cardiac standpoint. 5.  Use caution with anticoagulation therapy as patient appears to be bleeding from multiple sites and supratherapeutic doses and levels will put patient at high risk of bleeding from any of those sites. Subjective:  
 
Reason for consultation: Anemia with drop in H&H History: 50-year-old male with history of hypertensive atherosclerotic cardiovascular disease with coronary artery disease, status post MI, status post PCI, and Chronic EtOH abuse who presented to emergency room with increased weakness and nausea without vomiting and was found to have acute renal insufficiency and hyperkalemia. Patient also gave history of burning epigastric and chest pain. Patient states he has been feeling bad since Thanksgiving 2020. Does admit that it was the last time he views alcohol. Patient states is feeling he presently has with symptoms that he had approximately 1 year ago at the time of his acute MI.   Patient found to have a creatinine of 12.9.  Patient had to the place for hemodialysis had bleeding from that site. Patient also found to have elevations in his troponin and was diagnosed with a non-Q-wave MI. Patient is presently on anticoagulation therapy super elevated coagulation studies. Patient was noted to have a drop in his H&H and GI consult was sought to further evaluate. Hospital Problems  Never Reviewed Codes Class Noted POA Anemia associated with acute blood loss ICD-10-CM: D62 
ICD-9-CM: 285.1  12/12/2020 Unknown Alcohol abuse ICD-10-CM: F10.10 ICD-9-CM: 305.00  12/11/2020 Unknown Abdominal pain ICD-10-CM: R10.9 ICD-9-CM: 789.00  12/11/2020 Unknown Hyperkalemia ICD-10-CM: E87.5 ICD-9-CM: 276.7  12/11/2020 Yes Sepsis ([facility] [address] ICD-10-CM: A41.9 ICD-9-CM: 038.9, 995.91  12/11/2020 Yes NSTEMI (non-ST elevated myocardial infarction) ([facility] [address] ICD-10-CM: I21.4 ICD-9-CM: 410.70  12/10/2020 Unknown Acute renal failure (ARF) ([facility]) ICD-10-CM: N17.9 ICD-9-CM: 584.9  12/10/2020 Yes Past Medical History:  
Diagnosis Date  CAD (coronary artery disease)  GERD (gastroesophageal reflux disease)  Hypertension Past Surgical History:  
Procedure Laterality Date  IR INSERT NON TUNL CVC OVER 5 YRS  12/11/2020 Family History Problem Relation Age of Onset  No Known Problems Mother  No Known Problems Father Social History Tobacco Use  Smoking status: Former Smoker  Smokeless tobacco: Never Used Substance Use Topics  Alcohol use: Yes Frequency: 4 or more times a week Comment: Did not drink since Thanksgiving. Current Facility-Administered Medications Medication Dose Route Frequency Provider Last Rate Last Dose  
 0.9% sodium chloride infusion 250 mL  250 mL IntraVENous PRN Peg Willard MD      
 thiamine mononitrate (B-1) tablet 100 mg  100 mg Oral DAILY Grace Nunez MD   100 mg at 12/12/20 6068  folic acid (FOLVITE) tablet 1 mg  1 mg Oral DAILY Danae Saleh MD   1 mg at 12/12/20 4537  multivitamin (ONE A DAY) tablet 1 Tab  1 Tab Oral DAILY Danae Saleh MD   1 Tab at 12/12/20 3824  piperacillin-tazobactam (ZOSYN) 3.375 g in 0.9% sodium chloride (MBP/ADV) 100 mL MBP  3.375 g IntraVENous Q12H Sharona Jacinto  mL/hr at 12/12/20 0909 3.375 g at 12/12/20 4855  sevelamer carbonate (RENVELA) tab 1,600 mg  1,600 mg Oral TID WITH MEALS Kenny Sanchez MD   1,600 mg at 12/12/20 1220  
 sodium bicarbonate tablet 1,300 mg  1,300 mg Oral TID Kenny Sanchez MD   1,300 mg at 12/12/20 0909  heparin (porcine) 1,000 unit/mL injection 2,500 Units  2,500 Units Hemodialysis PRN Kenny Sanchez MD   2,500 Units at 12/11/20 1346  
 atorvastatin (LIPITOR) tablet 40 mg  40 mg Oral QHS Kate Whitfield MD   40 mg at 12/11/20 2216  heparin 25,000 units in D5W 250 ml infusion  12-25 Units/kg/hr IntraVENous TITRATE George Duncan MD 6.8 mL/hr at 12/12/20 0924 12 Units/kg/hr at 12/12/20 0924  
 heparin (porcine) 1,000 unit/mL injection 3,400 Units  60 Units/kg IntraVENous PRN George Duncan MD   3,400 Units at 12/11/20 1400 Or  heparin (porcine) 1,000 unit/mL injection 1,700 Units  30 Units/kg IntraVENous PRN George Duncan MD      
 nitroglycerin (NITROSTAT) tablet 0.4 mg  0.4 mg SubLINGual PRN George Duncan MD   0.4 mg at 12/10/20 2052  aspirin delayed-release tablet 81 mg  81 mg Oral DAILY Danae Salhe MD   81 mg at 12/12/20 3635  [Held by provider] clopidogreL (PLAVIX) tablet 75 mg  75 mg Oral DAILY Danae Saleh MD   Stopped at 12/12/20 0900  
 metoprolol tartrate (LOPRESSOR) tablet 25 mg  25 mg Oral BID Danae Saleh MD   Stopped at 12/12/20 0900  pantoprazole (PROTONIX) tablet 40 mg  40 mg Oral DAILY Danae Saleh MD   40 mg at 12/12/20 8131  sodium chloride (NS) flush 5-40 mL  5-40 mL IntraVENous Q8H Maday Giovani Sanders MD   10 mL at 12/12/20 3796  sodium chloride (NS) flush 5-40 mL  5-40 mL IntraVENous PRN Marysol Goode MD      
 polyethylene glycol (MIRALAX) packet 17 g  17 g Oral DAILY PRN Marysol Goode MD      
 promethazine (PHENERGAN) tablet 12.5 mg  12.5 mg Oral Q6H PRN Marysol Goode MD      
 Or  
 [facility] injection 4 mg  4 mg IntraVENous Q6H PRN Marysol Goode MD      
 albuterol CONCENTRATE 2.5mg/0.5 mL neb soln  1.25 mg Nebulization Q4H PRN Marysol Goode MD      
 0.9% sodium chloride infusion  125 mL/hr IntraVENous CONTINUOUS Marysol Goode  mL/hr at 12/11/20 0141 125 mL/hr at 12/11/20 0141  
 nicotine (NICODERM CQ) 14 mg/24 hr patch 1 Patch  1 Patch TransDERmal DAILY Marysol Goode MD   1 Patch at 12/12/20 8543 No Known Allergies Review of Systems: 
Constitutional: No recent weight change, fever,fatigue, (+) loss of appetite. ENT/Mouth: No hearing loss, nose bleeds, sore throat, voice change, hoarseness, or difficulties with chewing and swallowing. Cardiovascular: No chest pain, palpitations, swelling of feet/ankles/hands. Respiratory: No chronic or frequent coughs, spitting up blood, shortness of breath, asthma, or wheezing. Gastrointestinal: Epigastric abdominal pain, (+)heartburn, No nausea, vomiting, constipation, frequent diarrhea, rectal bleeding, or blood in stool. Genitourinary: No frequent urination, burning or painful urination, blood in urine. Musculoskeletal:  No joint pain, stiffness/swelling, weakness of muscles, muscle pain/cramp, or back pain. Integument:  No rash/itching, change in skin color. Neurological: No dizziness/vertigo, loss of consciousness, (+)numbness/tingling sensation in feet, No tremors, weakness in limbs, difficulty with balance, frequent or recurring headaches, memory loss or confusion.  
Psychiatric:  No nervousness, depression, hallucinations, paranoia or suspiciousness. Endocrine: No excessive thirst or urination, heat or cold intolerance. Hematologic/Lymphatic: No bleeding/bruising tendency, phlebitis, or past transfusion. Objective:  
 
Vitals:  
 12/12/20 1219 12/12/20 1326 12/12/20 1417 12/12/20 1515 BP: 96/62 101/68 (!) 93/58 Pulse: 80 90 88 90 Resp: 21 19 20 19 Temp:      
SpO2: 97% 96% 96% 97% Weight:      
Height:      
  
 
Physical Exam: 
General: Alert, cooperative, no distress. Head:  Normocephalic, without obvious abnormality, atraumatic. Eyes:  Conjunctivae/corneas clear. Pupils equal, round, reactive to light. Extraocular movements intact. Lungs:  Clear to auscultation bilaterally. Chest wall: No tenderness or deformity, hemodialysis's catheter in right upper chest wall with blood saturated in gauze. Heart:  Regular rate and rhythm, S1, S2 normal, no murmur, click, rub, or gallop. Abdomen:  Soft, non-tender. Bowel sounds normal. No masses. No organomegaly. Extremities: Extremities normal, atraumatic, no cyanosis or edema. Pulses: 2+ and symmetric all extremities. Skin: Skin color, texture, turgor normal. No rashes or lesions. Lymph nodes: Cervical, supraclavicular, and axillary nodes normal. 
Neurologic: CNII-XII intact. Normal strength, sensation, and reflexes throughout. CBC w/Diff Recent Labs 12/12/20 
1135 12/12/20 
0500 12/11/20 
0316 12/10/20 
2030 WBC  --  13.2* 12.1* 8.4  
RBC  --  2.40* 3.04* 3.39* HGB 7.3* 7.7* 10.2* 11.2* HCT 21.1* 23.2* 29.9* 34.3*  
PLT  --  261 332 354 GRANS  --  87* 89* 84* LYMPH  --  7* 4* 10* EOS  --  0 0 0 Chemistry Recent Labs 12/12/20 
0500 12/11/20 
1252 12/11/20 
0316 12/10/20 
2111 12/10/20 
1837 GLU 89  --  53*  --  95  
  --  138  --  136  
K 3.6  --  4.8 6.4* 6.3*  
  --  106  --  109* CO2 23  --  17*  --  11* BUN 50*  --  100*  --  87* CREA 8.57*  --  13.80*  --  12.90* CA 8.2* PENDING 8.6  --  8.4* MG  --   --   --  2.5*  -- PHOS  --   --   --  9.1*  --   
AGAP 12  --  15  --  16* BUCR 6*  --  7*  --  7* AP 81  --  98  --  104  
TP 5.9*  --  6.9  --  7.8 ALB 2.2*  --  2.4*  --  2.6*  
GLOB 3.7  --  4.5*  --  5.2* AGRAT 0.6*  --  0.5*  --  0.5* Lactic Acid Lactic acid Date Value Ref Range Status 12/11/2020 0.8 0.4 - 2.0 mmol/L Final  
 
Recent Labs 12/11/20 
1215 12/10/20 
2111 LAC 0.8 7.1* Micro  No results for input(s): SDES, CULT in the last 72 hours. No results for input(s): CULT in the last 72 hours. Liver Enzymes Protein, total  
Date Value Ref Range Status 12/12/2020 5.9 (L) 6.4 - 8.2 g/dL Final  
 
Albumin Date Value Ref Range Status 12/12/2020 2.2 (L) 3.5 - 5.0 g/dL Final  
 
Globulin Date Value Ref Range Status 12/12/2020 3.7 2.0 - 4.0 g/dL Final  
 
A-G Ratio Date Value Ref Range Status 12/12/2020 0.6 (L) 1.1 - 2.2   Final  
 
Alk. phosphatase Date Value Ref Range Status 12/12/2020 81 45 - 117 U/L Final  
 
Recent Labs 12/12/20 
0500 12/11/20 
0316 12/10/20 
1837 TP 5.9* 6.9 7.8 ALB 2.2* 2.4* 2.6*  
GLOB 3.7 4.5* 5.2* AGRAT 0.6* 0.5* 0.5* AP 81 98 104 Cardiac Enzymes Lab Results Component Value Date/Time TROIQ 59.60 (H) 12/12/2020 05:00 AM  
  
 
BNP No results found for: BNP, BNPP, XBNPT Coagulation Recent Labs 12/12/20 
0500 12/11/20 
2000 12/11/20 
1215 APTT 71.1* 112.6* 40.6* Thyroid  No results found for: T4, T3U, TSH, TSHEXT Lipid Panel No results found for: CHOL, CHOLPOCT, CHOLX, CHLST, CHOLV, 590723, HDL, HDLP, LDL, LDLC, DLDLP, 860694, VLDLC, VLDL, TGLX, TRIGL, TRIGP, TGLPOCT, CHHD, CHHDX Urinalysis Lab Results Component Value Date/Time  Color Dark Yellow 12/11/2020 10:30 AM  
 Appearance Turbid (A) 12/11/2020 10:30 AM  
 Specific gravity 1.020 12/11/2020 10:30 AM  
 pH (UA) 6.0 12/11/2020 10:30 AM  
 Protein 100 (A) 12/11/2020 10:30 AM  
 Glucose Negative 12/11/2020 10:30 AM  
 Ketone Negative 12/11/2020 10:30 AM  
 Bilirubin Negative 12/11/2020 10:30 AM  
 Urobilinogen 0.1 12/11/2020 10:30 AM  
 Nitrites Negative 12/11/2020 10:30 AM  
 Leukocyte Esterase Negative 12/11/2020 10:30 AM  
 Bacteria Negative 12/11/2020 10:30 AM  
 WBC 5-10 12/11/2020 10:30 AM  
 RBC 5-10 12/11/2020 10:30 AM  
  
 
XR (Most Recent). CXR reviewed by me and compared with previous CXR Results from [facility] Encounter encounter on 12/10/20 XR CHEST PORT Narrative Portable chest, AP upright, 1325 hours. Comparison with 12/11/2020. Stable 
appearance of right central line, with tip overlying the lower SVC. No 
pneumothorax. Sequelae of remote left posterolateral rib fractures. Atherosclerotic arterial calcification is noted. Stable heart size. Streaky left 
infrahilar opacities appear stable, probably fibrosis or subsegmental 
atelectasis. No definite luminary edema or focal airspace pneumonia. Impression IMPRESSION: No evidence of pneumothorax or pleural fluid collection. CT (Most Recent) Results from [facility] Encounter encounter on 12/10/20 CT ABD PELV WO CONT Narrative Noncontrast exam includes the abdomen and pelvis. No priors. Dose Reduction: All CT scans at this facility are performed using dose reduction optimization 
techniques as appropriate to a performed exam including the following: Automated 
exposure control, adjustments of the mA and/or kV according to patient size, or 
use of iterative reconstruction technique. Negative for bowel obstruction or perforation. 1.5 mm nonobstructing calculus left renal upper pole sinus. No ureter calculus 
or hydronephrosis. Unremarkable urinary bladder Markedly distended gallbladder. No adjacent fluid or inflammatory change. No 
radiodense gallstones or biliary ductal dilation. Atherosclerosis. No obvious findings of solid organ or bowel ischemia. No adenopathy or concerning mass lesion.  
Peripheral ill-defined and nodular densities noted in the visualized inferior 
right lower lobe, could reflect acute inflammatory disease among other 
possibilities Scarring noted in the inferior lateral left lower lobe. Impression IMPRESSION: 
1. No hydronephrosis. 2. Small nonobstructing left renal upper pole sinus calculus. No ureter 
calculus. 3. Markedly distended gallbladder without other findings potentially reflecting 
cholecystitis. 4. Atherosclerosis. 5. Peripheral ill-defined and nodular densities in the right lower lobe, could 
reflect acute inflammatory disease or infection among other possibilities. Delonte Epperson MD 
12/12/2020 
4:53 PM.

## 2020-12-12 NOTE — PROGRESS NOTES
Renal follow-up Progress Note Date:2020       Room:[facility] Patient Gloria Ramirez     YOB: 1955     Age:65 y.o. Subjective Subjective: 
Symptoms:  No shortness of breath, cough, chest pain or diarrhea. Diet:  No nausea or vomiting. patient  Is laying flat without any shortness of breath 
 he continued to bleed actively from dialysis catheter  Site 
 denies having any abdominal pain, chest pain, nausea and vomiting 
 so far urine output remains less than 100 mL and blood in the urine is positive BP remains low despite IV fluids running at 125 mL/hour Hemoglobin and hematocrit dropped due to active bleeding from dialysis catheter site 
 patient needed to go back on heparin due to increased troponin from acute MI Review of Systems Constitutional: Positive for fatigue. Negative for activity change, appetite change, chills and fever. HENT: Negative for facial swelling, hearing loss, nosebleeds and trouble swallowing. Eyes: Negative for discharge. Respiratory: Negative for cough, choking, chest tightness, shortness of breath and wheezing. Cardiovascular: Negative for chest pain, palpitations and leg swelling. Gastrointestinal: Negative for abdominal distention, diarrhea, nausea and vomiting. Endocrine: Positive for cold intolerance. Genitourinary: Positive for decreased urine volume. Negative for difficulty urinating and dysuria. Musculoskeletal: Negative for back pain and joint swelling. Skin: Negative for pallor and rash. Neurological: Negative for speech difficulty and headaches. Psychiatric/Behavioral: Negative for agitation, behavioral problems and confusion. Objective Vitals Last 24 Hours: TEMPERATURE:  Temp  Av.6 °F (37 °C)  Min: 98 °F (36.7 °C)  Max: 99.4 °F (37.4 °C) RESPIRATIONS RANGE: Resp  Av.3  Min: 16  Max: 24 
PULSE OXIMETRY RANGE: SpO2  Av.6 %  Min: 97 %  Max: 100 % PULSE RANGE: Pulse  Av.1  Min: 78  Max: 98 
BLOOD PRESSURE RANGE: Systolic (83LLT), NUF:971 , Min:78 , BGI:623  
; Diastolic (58WGY), RPW:38, Min:53, Max:87 I/O (24Hr): Intake/Output Summary (Last 24 hours) at 2020 1259 Last data filed at 2020 0600 Gross per 24 hour Intake  Output -70 ml Net 70 ml Objective: 
General Appearance:  Ill-appearing, in no acute distress, not in pain and uncomfortable. Vital signs: (most recent): Blood pressure 96/62, pulse 80, temperature 98.8 °F (37.1 °C), resp. rate 21, height 5' 10\" (1.778 m), weight 52.6 kg (115 lb 15.4 oz), SpO2 97 %. No fever. Output: Minimal urine output. HEENT: Normal HEENT exam.   
Lungs:  Normal effort and normal respiratory rate. Breath sounds clear to auscultation. He is not in respiratory distress. No stridor. No rales. Heart: Normal rate. Regular rhythm. Abdomen: Abdomen is soft and non-distended. Bowel sounds are normal.   There is no abdominal tenderness. Extremities: There is no deformity, venous stasis, dependent edema or local swelling. Neurological: Patient is alert and oriented to person, place and time. Skin:  Warm and dry. No rash or ulceration. Labs/Imaging/Diagnostics Labs: CBC: 
Recent Labs 20 
1135 20 
0500 12/11/20 
0316 12/10/20 
2030 WBC  --  13.2* 12.1* 8.4  
RBC  --  2.40* 3.04* 3.39* HGB 7.3* 7.7* 10.2* 11.2* HCT 21.1* 23.2* 29.9* 34.3*  
MCV  --  96.7 98.4 101.2*  
RDW  --  13.4 13.6 14.2 PLT  --  261 332 354 CHEMISTRIES: 
Recent Labs 20 
0500 20 
0316 12/10/20 
2111 12/10/20 
1837  138  --  136  
K 3.6 4.8 6.4* 6.3*  
 106  --  109* CO2 23 17*  --  11* BUN 50* 100*  --  87* CA 8.2* 8.6  --  8.4* PHOS  --   --  9.1*  --   
MG  --   --  2.5*  --   
PT/INR:No results for input(s): INR, INREXT in the last 72 hours. No lab exists for component: PROTIME APTT: 
Recent Labs   20 
0500 20 2000 12/11/20 
1215 APTT 71.1* 112.6* 40.6* LIVER PROFILE: 
Recent Labs 12/12/20 
0500 12/11/20 
0316 12/10/20 
1837 * 1,166* 1,159* * 438* 454* Lab Results Component Value Date/Time ALT (SGPT) 334 (H) 12/12/2020 05:00 AM  
 AST (SGOT) 884 (H) 12/12/2020 05:00 AM  
 Alk. phosphatase 81 12/12/2020 05:00 AM  
 Bilirubin, total 0.3 12/12/2020 05:00 AM  
 
 
Imaging Last 24 Hours: 
Us Abd Comp Result Date: 12/11/2020 Ultrasound abdomen Mild distention of the gallbladder. No gallbladder wall edematous change or pericholecystic fluid. No gallstones are evident. Imaged liver parenchyma appears unremarkable. Hepatopedal flow portal vein. Common bile duct in the region of the jb hepatis measures within normal limits at 5 mm. Pancreas obscured by bowel gas. IVC nondistended. Imaged upper abdominal aorta appears unremarkable. Right kidney in long axis measures 10 cm. Left kidney measures 9.5 cm. No hydronephrosis. Normal volume spleen. Impression: No ultrasound evidence for infectious/inflammatory process involving the gallbladder. Assessment//Plan Active Problems: 
  NSTEMI (non-ST elevated myocardial infarction) ([address] (12/10/2020) Acute renal failure (ARF) ([address] (12/10/2020) Alcohol abuse (12/11/2020) Abdominal pain (12/11/2020) Hyperkalemia (12/11/2020) Sepsis ([address] (12/11/2020) Anemia associated with acute blood loss (12/12/2020) Assessment & Plan 1- patient with Acute kidney injury  Most likely due to ATN Patient received dialysis treatment yesterday with which labs are improved somewhat I do not think he  will benefit from another dialysis treatment today  as volume status   and electrolyte abnormalities such as metabolic acidosis and hyperkalemia-  stable We will  continue to monitor patient for intakes and outputs, any signs of volume overload or  Electrolyte imbalances which cannot be managed medically 2- anemia with significant drop in hemoglobin from 11 g to 7.3 g associated with hypotension- hence I recommend blood transfusion 1 unit of packed RBCs to be given in 4 hours  Especially in the presence of acute MI While patient is getting blood transfusion hold IV fluids 3- abnormal LFTs- etiology to be determined 4- hyperkalemia resolved 5- NSTEMI- with increasing troponin level- followed by Dr. Dominic Vo- defer Rx to him. thank you for consult 
 case is discussed with Dr. Yosi Tanner.  
Electronically signed by Carmen Lowery MD on 12/12/2020 at 12:59 PM

## 2020-12-12 NOTE — PROGRESS NOTES
Bedside shift change report given to Timbo Linn RN (oncoming nurse) by Santy Nuno RN (offgoing nurse). Report included the following information SBAR.

## 2020-12-13 NOTE — PROGRESS NOTES
Progress Note 12/13/2020 3:07 PM 
NAME: Saloni De La Torre MRN:  [id number] Admit Diagnosis: NSTEMI (non-ST elevated [facility] infarction) [facility] [I21.4] Acute renal failure (ARF) ([facility]) [N17.9] Subjective:  
Chart reviewed. Discussed with  the nurse. Patient denies any chest pain. Denies any epigastric discomfort. Has a serious drop of hemoglobin and nurse mentions that oozing blood from insertion site for the dialysis catheter has stopped. Had a dialysis yesterday. Review of Systems: 
 
Symptom Y/N Comments  Symptom Y/N Comments Fever/Chills n   Chest Pain n   
Poor Appetite    Edema Cough n   Abdominal Pain n   
Sputum    Joint Pain SOB/STERN n   Pruritis/Rash Nausea/vomit n   Other Diarrhea Constipation Could NOT obtain due to:   
 
Objective:  
  
 
 
Physical Exam: 
 
Last 24hrs VS reviewed since prior progress note. Most recent are: 
 
Visit Vitals BP 99/63 (BP 1 Location: Right arm, BP Patient Position: At rest) Pulse 89 Temp 98 °F (36.7 °C) Resp 22 Ht 5' 10\" (1.778 m) Wt 52.6 kg (115 lb 15.4 oz) SpO2 100% BMI 16.64 kg/m² Intake/Output Summary (Last 24 hours) at 12/13/2020 1242 Last data filed at 12/12/2020 1924 Gross per 24 hour Intake 237.5 ml Output  Net 237.5 ml  
  
 
General Appearance: Well developed, well nourished, alert & oriented x 3,  
 no acute distress. Ears/Nose/Mouth/Throat: Hearing grossly normal. 
Neck: Supple. Chest: Lungs clear to auscultation bilaterally. Cardiovascular: Regular rate and rhythm, S1,S2 normal, no murmur. Abdomen: Soft, non-tender, bowel sounds are active. Extremities: No edema bilaterally. Skin: Warm and dry. []         Post-cath site without hematoma, bruit, tenderness, or thrill. Distal pulses intact. PMH/SH reviewed - no change compared to H&P Data Review Telemetry: normal sinus rhythm EKG:  
[x]   Sinus rhythm right bundle branch block, ST depression seems to have improved to some extent. Lab Data Personally Reviewed: 
 
Recent Labs 12/13/20 
0515 12/12/20 2110 WBC 12.8* 11.3* HGB 8.5* 8.5* HCT 25.0* 24.8*  
 193 Recent Labs 12/13/20 
0950 12/12/20 
2110 12/12/20 
1545 APTT 94.1* 43.8* 144.6* Recent Labs 12/13/20 
0515 12/12/20 
0500 12/11/20 
1252 12/11/20 
0316 12/10/20 
2111  138  --  138  --   
K 3.6 3.6  --  4.8 6.4*  
 103  --  106  --   
CO2 22 23  --  17*  --   
BUN 56* 50*  --  100*  --   
CREA 9.42* 8.57*  --  13.80*  --   
GLU 95 89  --  53*  --   
CA 8.2* 8.2* 7.9* 8.6  --   
MG  --   --   --   --  2.5* Recent Labs 12/12/20 
0500 12/11/20 
1215 12/10/20 
2111 CPK  --   --  Hemolyzed, recollect requested TROIQ 59.60* 72.40* 2.96* No results found for: CHOL, CHOLX, CHLST, CHOLV, HDL, HDLP, LDL, LDLC, DLDLP, TGLX, TRIGL, TRIGP, CHHD, CHHDX Recent Labs 12/13/20 
0515 12/12/20 
0500 12/11/20 
0316 12/10/20 
1837 * 81 98 104  
TP 5.6* 5.9* 6.9 7.8 ALB 2.0* 2.2* 2.4* 2.6*  
GLOB 3.6 3.7 4.5* 5.2*  
LPSE  --   --   --  469* Recent Labs 12/10/20 
2135 PH 7.25* PCO2 20* PO2 271* Medications Personally Reviewed: 
 
Current Facility-Administered Medications Medication Dose Route Frequency  0.9% sodium chloride infusion 250 mL  250 mL IntraVENous PRN  thiamine mononitrate (B-1) tablet 100 mg  100 mg Oral DAILY  folic acid (FOLVITE) tablet 1 mg  1 mg Oral DAILY  multivitamin (ONE A DAY) tablet 1 Tab  1 Tab Oral DAILY  piperacillin-tazobactam (ZOSYN) 3.375 g in 0.9% sodium chloride (MBP/ADV) 100 mL MBP  3.375 g IntraVENous Q12H  sevelamer carbonate (RENVELA) tab 1,600 mg  1,600 mg Oral TID WITH MEALS  sodium bicarbonate tablet 1,300 mg  1,300 mg Oral TID  heparin (porcine) 1,000 unit/mL injection 2,500 Units  2,500 Units Hemodialysis PRN  
 atorvastatin (LIPITOR) tablet 40 mg  40 mg Oral QHS  heparin 25,000 units in D5W 250 ml infusion 12-25 Units/kg/hr IntraVENous TITRATE  heparin (porcine) 1,000 unit/mL injection 3,400 Units  60 Units/kg IntraVENous PRN Or  
 heparin (porcine) 1,000 unit/mL injection 1,700 Units  30 Units/kg IntraVENous PRN  
 nitroglycerin (NITROSTAT) tablet 0.4 mg  0.4 mg SubLINGual PRN  
 aspirin delayed-release tablet 81 mg  81 mg Oral DAILY  [Held by provider] clopidogreL (PLAVIX) tablet 75 mg  75 mg Oral DAILY  metoprolol tartrate (LOPRESSOR) tablet 25 mg  25 mg Oral BID  pantoprazole (PROTONIX) tablet 40 mg  40 mg Oral DAILY  sodium chloride (NS) flush 5-40 mL  5-40 mL IntraVENous Q8H  
 sodium chloride (NS) flush 5-40 mL  5-40 mL IntraVENous PRN  polyethylene glycol (MIRALAX) packet 17 g  17 g Oral DAILY PRN  promethazine (PHENERGAN) tablet 12.5 mg  12.5 mg Oral Q6H PRN Or  
 ondansetron (ZOFRAN) injection 4 mg  4 mg IntraVENous Q6H PRN  
 albuterol CONCENTRATE 2.5mg/0.5 mL neb soln  1.25 mg Nebulization Q4H PRN  
 0.9% sodium chloride infusion  125 mL/hr IntraVENous CONTINUOUS  
 nicotine (NICODERM CQ) 14 mg/24 hr patch 1 Patch  1 Patch TransDERmal DAILY Problem List:  
Non-Q wave myocardial infarction. Patient has a serious drop of hemoglobin and it could be from puncture of subclavian for dialysis catheter but he could have a GI bleed also. Acute renal failure and patient had a dialysis on the day before yesterday. Patient received 1 unit of transfusion and hemoglobin has not dropped further. Lefty Crawford 1.   
 
Assessment/Plan:  
Because of her acute renal failure and serious drop of hemoglobin I will probably not rush him to cardiac Cath Lab until his hemoglobin is somewhat stable. May be reasonable to consider GI evaluation also. Discussed  with the nurse and probably will discussed with the nephrologist and the medical doctor also. Prefer to give him Plavix 300 mg p.o. today and monitor his hemoglobin to see if it is dropping or not by tomorrow.   Prefer to have him have dialysis today if agreeable by the nephrologist.  Patient needs cardiac catheterization and possible PCI however he is drop of hemoglobin and renal function is concerning and fact that patient has no symptoms at this time and troponin I is coming down, we will monitor him closely and I will plan catheterization when appropriate. Thank you. 1.   
 
  
 [x]       High complexity decision making was performed in this patient at high risk for decompensation with multiple organ involvement.  
 
Efrain Serna MD

## 2020-12-13 NOTE — PROGRESS NOTES
Hospitalist Progress Note Daily Progress Note: 12/13/2020 Subjective: The patient is seen for follow  up.  
50-year-old male was admitted last night with a complaint of abdominal pain in the epigastric area and lower chest pain. Patient was found to have mildly elevated troponins on admission which has increased to 72.4 now. Cardiology has recommended to restart the patient on heparin drip. Patient denies any chest pain at this time. Patient was also found to have acute renal failure and has been started on hemodialysis. Patient also endorses significant alcohol abuse. Patient had elevation in troponins and was started on heparin drip. He was noted to have oozing of blood from dialysis catheter site. This was pressure dressed. H&H drop also noticed. Patient received blood transfusion following this H&H has been stable. Patient had only 30ml of urine output. Hematuria noticed on that. Medications reviewed Current Facility-Administered Medications Medication Dose Route Frequency  clopidogreL (PLAVIX) 300 mg  300 mg Oral ONCE  
 0.9% sodium chloride infusion 250 mL  250 mL IntraVENous PRN  thiamine mononitrate (B-1) tablet 100 mg  100 mg Oral DAILY  folic acid (FOLVITE) tablet 1 mg  1 mg Oral DAILY  multivitamin (ONE A DAY) tablet 1 Tab  1 Tab Oral DAILY  piperacillin-tazobactam (ZOSYN) 3.375 g in 0.9% sodium chloride (MBP/ADV) 100 mL MBP  3.375 g IntraVENous Q12H  sevelamer carbonate (RENVELA) tab 1,600 mg  1,600 mg Oral TID WITH MEALS  sodium bicarbonate tablet 1,300 mg  1,300 mg Oral TID  heparin (porcine) 1,000 unit/mL injection 2,500 Units  2,500 Units Hemodialysis PRN  
 atorvastatin (LIPITOR) tablet 40 mg  40 mg Oral QHS  heparin 25,000 units in D5W 250 ml infusion  12-25 Units/kg/hr IntraVENous TITRATE  heparin (porcine) 1,000 unit/mL injection 3,400 Units  60 Units/kg IntraVENous PRN  Or  
 heparin (porcine) 1,000 unit/mL injection 1,700 Units  30 Units/kg IntraVENous PRN  
 nitroglycerin (NITROSTAT) tablet 0.4 mg  0.4 mg SubLINGual PRN  
 aspirin delayed-release tablet 81 mg  81 mg Oral DAILY  clopidogreL (PLAVIX) tablet 75 mg  75 mg Oral DAILY  metoprolol tartrate (LOPRESSOR) tablet 25 mg  25 mg Oral BID  pantoprazole (PROTONIX) tablet 40 mg  40 mg Oral DAILY  sodium chloride (NS) flush 5-40 mL  5-40 mL IntraVENous Q8H  
 sodium chloride (NS) flush 5-40 mL  5-40 mL IntraVENous PRN  polyethylene glycol (MIRALAX) packet 17 g  17 g Oral DAILY PRN  promethazine (PHENERGAN) tablet 12.5 mg  12.5 mg Oral Q6H PRN Or  
 ondansetron (ZOFRAN) injection 4 mg  4 mg IntraVENous Q6H PRN  
 albuterol CONCENTRATE 2.5mg/0.5 mL neb soln  1.25 mg Nebulization Q4H PRN  
 0.9% sodium chloride infusion  125 mL/hr IntraVENous CONTINUOUS  
 nicotine (NICODERM CQ) 14 mg/24 hr patch 1 Patch  1 Patch TransDERmal DAILY Review of Systems: A comprehensive review of systems was negative except for that written in the HPI. Objective:  
Physical Exam:  
 
Visit Vitals BP 98/64 (BP 1 Location: Right arm, BP Patient Position: At rest) Pulse 92 Temp 98 °F (36.7 °C) Resp 26 Ht 5' 10\" (1.778 m) Wt 52.6 kg (115 lb 15.4 oz) SpO2 97% BMI 16.64 kg/m² O2 Device: Room air Temp (24hrs), Av.3 °F (36.8 °C), Min:98 °F (36.7 °C), Max:98.8 °F (37.1 °C) No intake/output data recorded.  1901 -  0700 In: 237.5 Out: 30 [Urine:30] PHYSICAL EXAM: 
General: alert and awake, not in distress Skin: Extremities and face reveal no rashes. HEENT: Sclerae anicteric. Extra-occular muscles are intact. No oral ulcers. No ENT discharge. The neck is supple. Pressure dressing of dialysis catheter noticed. No active bleeding noticed. .  
Cardiovascular: Regular rate and rhythm. No murmurs, gallops, or rubs. PMI nondisplaced. Carotids without bruits. Respiratory: Comfortable breathing with no accessory muscle use.  Clear breath sounds with no wheezes, rales, or rhonchi. GI: Abdomen nondistended, soft, and nontender. Normal active bowel sounds. No enlargement of the liver or spleen. No masses palpable. Rectal: Deferred Musculoskeletal: No pitting edema of the lower legs. Extremities have good range of motion. No costovertebral tenderness. Neurological: Gross memory appears intact. Patient is alert and oriented. Power 5/5, Cranial nerves II-XII intact Psychiatric: Mood appears appropriate with judgement intact. Lymphatic: No cervical or supraclavicular adenopathy. Data Review:  
   
Recent Days: 
Recent Labs 12/13/20 
0515 12/12/20 
2110 12/12/20 
1135 12/12/20 
0500 WBC 12.8* 11.3*  --  13.2* HGB 8.5* 8.5* 7.3* 7.7* HCT 25.0* 24.8* 21.1* 23.2*  
 193  --  261 Recent Labs 12/13/20 
0515 12/12/20 
0500 12/11/20 
1252 12/11/20 
0316 12/10/20 
2111  138  --  138  --   
K 3.6 3.6  --  4.8 6.4*  
 103  --  106  --   
CO2 22 23  --  17*  --   
GLU 95 89  --  53*  --   
BUN 56* 50*  --  100*  --   
CREA 9.42* 8.57*  --  13.80*  --   
CA 8.2* 8.2* 7.9* 8.6  --   
MG  --   --   --   --  2.5* PHOS  --   --   --   --  9.1* ALB 2.0* 2.2*  --  2.4*  --   
TBILI 0.3 0.3  --  0.3  --   
* 334*  --  438*  --   
 
Recent Labs 12/10/20 
2135 PH 7.25* PCO2 20* PO2 271* HCO3 12* FIO2 100.0 CMP:  
Lab Results Component Value Date/Time Glucose 95 12/13/2020 05:15 AM  
 Sodium 139 12/13/2020 05:15 AM  
 Potassium 3.6 12/13/2020 05:15 AM  
 Chloride 103 12/13/2020 05:15 AM  
 CO2 22 12/13/2020 05:15 AM  
 BUN 56 (H) 12/13/2020 05:15 AM  
 Creatinine 9.42 (H) 12/13/2020 05:15 AM  
 Calcium 8.2 (L) 12/13/2020 05:15 AM  
 Anion gap 14 12/13/2020 05:15 AM  
 BUN/Creatinine ratio 6 (L) 12/13/2020 05:15 AM  
 Alk.  phosphatase 244 (H) 12/13/2020 05:15 AM  
 Protein, total 5.6 (L) 12/13/2020 05:15 AM  
 Albumin 2.0 (L) 12/13/2020 05:15 AM  
 Globulin 3.6 12/13/2020 05:15 AM  
 A-G Ratio 0.6 (L) 12/13/2020 05:15 AM  
 
Cardiac markers:  
Lab Results Component Value Date/Time CK Hemolyzed, recollect requested 12/10/2020 09:11 PM  
 
Radiology review:  
 
 
XR CHEST PORT Final Result IMPRESSION: No evidence of pneumothorax or pleural fluid collection. US ABD COMP Final Result Impression: No ultrasound evidence for infectious/inflammatory process involving  
the gallbladder. XR CHEST PORT Final Result Impression:   
Successful placement of a nontunneled hemodialysis catheter. The catheter is  
ready for use. IR INSERT NON TUNL CVC OVER 5 YRS Final Result Impression:   
Successful placement of a nontunneled hemodialysis catheter. The catheter is  
ready for use. IR [facility] Final Result Impression:   
Successful placement of a nontunneled hemodialysis catheter. The catheter is  
ready for use. CT ABD PELV WO CONT Final Result IMPRESSION:  
1. No hydronephrosis. 2. Small nonobstructing left renal upper pole sinus calculus. No ureter  
calculus. 3. Markedly distended gallbladder without other findings potentially reflecting  
cholecystitis. 4. Atherosclerosis. 5. Peripheral ill-defined and nodular densities in the right lower lobe, could  
reflect acute inflammatory disease or infection among other possibilities. XR CHEST PORT Final Result US RETROPERITONEUM COMP    (Results Pending) US ABD LTD    (Results Pending) Assessment/  
 
Problem List: 
Hospital Problems  Never Reviewed Codes Class Noted POA Anemia associated with acute blood loss ICD-10-CM: D62 
ICD-9-CM: 285.1  12/12/2020 Unknown Alcohol abuse ICD-10-CM: F10.10 ICD-9-CM: 305.00  12/11/2020 Unknown Abdominal pain ICD-10-CM: R10.9 ICD-9-CM: 789.00  12/11/2020 Unknown Hyperkalemia ICD-10-CM: E87.5 ICD-9-CM: 276.7  12/11/2020 Yes Sepsis ([address] ICD-10-CM: A41.9 ICD-9-CM: 038.9, 995.91  12/11/2020 Yes NSTEMI (non-ST elevated myocardial infarction) ([facility] ICD-10-CM: I21.4 ICD-9-CM: 410.70  12/10/2020 Unknown Acute renal failure (ARF) ([facility]) ICD-10-CM: N17.9 ICD-9-CM: 584.9  12/10/2020 Yes Plan: 1. NSTEMI: Continue the patient on heparin drip and aspirin and Lipitor. Hold patient's Plavix as patient will be on heparin drip. Continue patient on Lopressor. Cardiology evaluation of the patient will be appreciated Cardiology recommend starting loading dose of plavix 300mg once and then 75 mg daily. Plan for cath in AM 
2. Anemia, acute blood lose- Pressure dressing of the dialysis catheter- will repeat H&H. Transfused one unit. H&H stable. 3.  Acute renal failure with hyperkalemia:follow nephrology recommendations. 3.  Abdominal pain with dilated gallbladder on CT scan. USG abd does not show signs of cholecystitis. Follow with surgery. Gall Bladder distension likely from poor oral intake. 4.  Alcohol abuse: We will continue the patient on thiamine and multivitamins. Watch for any withdrawal symptoms. 5.  Sepsis: Patient has significantly elevated lactic acid level. Suspicious for sepsis. Patient is empirically started on IV antibiotics. We will follow-up on patient's blood cultures and urine culture. 7. Metabolic acidosis- due to renal failure- improved. Care Plan discussed with: Patient/Family,  and Consultant surgery.  
 
Ada Mcgrath MD

## 2020-12-13 NOTE — PROGRESS NOTES
Renal follow-up Progress Note Date:2020       Room:[facility] Patient Gloria Ramirez     YOB: 1955     Age:65 y.o. Subjective Subjective: 
Symptoms:  No shortness of breath, cough, chest pain or diarrhea. Diet:  No nausea or vomiting. Patient is looking better today and no new complaints offered Dr. Cesar Milner is trying to schedule him for cardiac cath in the a.m. He requested patient to be dialyzed today to prepare him for cardiac cath tomorrow Per patient does not meet any criteria for emergency dialysis today Which I made Dr. Cesar Milner aware . Review of Systems Constitutional: Negative for activity change, appetite change, chills, fatigue and fever. HENT: Negative for facial swelling, hearing loss, nosebleeds and trouble swallowing. Eyes: Negative for discharge. Respiratory: Negative for cough, choking, chest tightness, shortness of breath and wheezing. Cardiovascular: Negative for chest pain, palpitations and leg swelling. Gastrointestinal: Negative for abdominal distention, abdominal pain, diarrhea, nausea and vomiting. Endocrine: Negative for cold intolerance. Genitourinary: Positive for decreased urine volume. Negative for difficulty urinating, dysuria and frequency. Musculoskeletal: Negative for back pain and joint swelling. Skin: Negative for pallor and rash. Neurological: Negative for dizziness, speech difficulty and headaches. Psychiatric/Behavioral: Negative for agitation, behavioral problems and confusion. Medications Zosyn 3.375 g every 12 hours Sodium bicarbonate-1300 mg 3 times daily Heparin IV Aspirin 81 mg daily Plavix 75 mg daily Metoprolol 25 mg daily Renvela 1.6 g 3 times daily with meals Objective Vitals Last 24 Hours: TEMPERATURE:  Temp  Av.3 °F (36.8 °C)  Min: 98 °F (36.7 °C)  Max: 98.8 °F (37.1 °C) RESPIRATIONS RANGE: Resp  Av.6  Min: 11  Max: 26 PULSE OXIMETRY RANGE: SpO2 Av.9 %  Min: 96 %  Max: 100 % PULSE RANGE: Pulse  Av.1  Min: 79  Max: 95 
BLOOD PRESSURE RANGE: Systolic (23BHC), WXM:745 , Min:82 , NN  
; Diastolic (68LMZ), KNH:31, Min:51, Max:93 I/O (24Hr): Intake/Output Summary (Last 24 hours) at 2020 1431 Last data filed at 2020 1924 Gross per 24 hour Intake 237.5 ml Output  Net 237.5 ml Objective: 
General Appearance:  Ill-appearing, in no acute distress, not in pain and uncomfortable. Vital signs: (most recent): Blood pressure 103/67, pulse 89, temperature 98 °F (36.7 °C), resp. rate 20, height 5' 10\" (1.778 m), weight 52.6 kg (115 lb 15.4 oz), SpO2 96 %. No fever. Output: Minimal urine output. HEENT: Normal HEENT exam.   
Lungs:  Normal effort and normal respiratory rate. Breath sounds clear to auscultation. He is not in respiratory distress. No stridor. No rales. Heart: Normal rate. Regular rhythm. Abdomen: Abdomen is soft and non-distended. Bowel sounds are normal.   There is no abdominal tenderness. Extremities: There is no deformity, venous stasis, dependent edema or local swelling. Neurological: Patient is alert and oriented to person, place and time. Skin:  Warm and dry. No rash or ulceration. Labs/Imaging/Diagnostics Labs: CBC: 
Recent Labs 20 
0515 20 
2110 20 
1135 20 
0500 WBC 12.8* 11.3*  --  13.2*  
RBC 2.64* 2.61*  --  2.40* HGB 8.5* 8.5* 7.3* 7.7* HCT 25.0* 24.8* 21.1* 23.2*  
MCV 94.7 95.0  --  96.7 RDW 15.0* 14.4  --  13.4  193  --  261 CHEMISTRIES: 
Recent Labs 20 
0515 20 
0500 20 
1252 20 
0316 12/10/20 
2111  138  --  138  --   
K 3.6 3.6  --  4.8 6.4*  
 103  --  106  --   
CO2 22 23  --  17*  --   
BUN 56* 50*  --  100*  --   
CA 8.2* 8.2* 7.9* 8.6  --   
PHOS  --   --   --   --  9.1*  
MG  --   --   --   --  2.5*  
PT/INR:No results for input(s): INR, INREXT, INREXT in the last 72 hours. No lab exists for component: PROTIME APTT: 
Recent Labs 12/13/20 
0950 12/12/20 
2110 12/12/20 
1545 APTT 94.1* 43.8* 144.6* LIVER PROFILE: 
Recent Labs 12/13/20 
0515 12/12/20 
0500 12/11/20 
0316 * 884* 1,166* * 334* 438* Lab Results Component Value Date/Time ALT (SGPT) 347 (H) 12/13/2020 05:15 AM  
 AST (SGOT) 789 (H) 12/13/2020 05:15 AM  
 Alk. phosphatase 244 (H) 12/13/2020 05:15 AM  
 Bilirubin, total 0.3 12/13/2020 05:15 AM  
 
 
Imaging Last 24 Hours: 
No results found. Assessment//Plan Active Problems: 
  NSTEMI (non-ST elevated myocardial infarction) ([address] (12/10/2020) Acute renal failure (ARF) ([address] (12/10/2020) Alcohol abuse (12/11/2020) Abdominal pain (12/11/2020) Hyperkalemia (12/11/2020) Sepsis ([address] (12/11/2020) Anemia associated with acute blood loss (12/12/2020) Assessment & Plan 1- patient with Acute kidney injury  most likely due to ATN We will start patient on normal saline at 100 mL/h x 1 bag then decrease it to 75 mill per hour Since serum creatinine has gone up slightly, he will be evaluated for dialysis NEED a.m. 2- Anemia with improved hemoglobin after receiving 1 unit of packed RBCs at 8.5 g 
 3- abnormal LFTs-multiple etiologies 4- hyperkalemia resolved 5- NSTEMI- with increasing troponin level- followed by Dr. Ernie Wooten- defer Rx to him. thank you for consult 6-3 hyperparathyroidism recheck phosphorus level and decide about the dosage of Renvela 7 -metabolic acidosis new sodium bicarbonate 1.3 g 3 times daily Electronically signed by Jules Gaspar MD on 12/13/2020 at 12:59 PM

## 2020-12-14 NOTE — PROGRESS NOTES
Physician Progress Note Treasure Colunga 
CSN #:                  [id number] :                       1955 ADMIT DATE:       12/10/2020 5:30 PM 
[address] [place] DATE: 
RESPONDING 
PROVIDER #:        Isaura Payne MD 
 
 
 
 
QUERY TEXT: 
 
Greta Kurtcristian Lupe Patient admitted with BMI of 16.6. If possible, please document in progress notes and discharge summary if you are evaluating and /or treating any of the following: The medical record reflects the following: 
Risk Factors: CAD, GERD, HTN Clinical Indicators: Albumin 1.7, Protein 5.5, Nutrition Consult: Severe malnutrition -Severe muscle mass loss(moderate- shoulder, temple), Thigh (quadraceps), Calf (gastrocnemius); Mild body fat loss, Triceps, Fat overlying ribs Treatment:   Adding Ensure Clear TID (720kcals, 24g protein) Thank you, Shari Saenz RN, CCDS, [facility] Options provided: -- Protein calorie malnutrition mild -- Protein calorie malnutrition moderate -- Protein calorie malnutrition severe 
-- Underweight with BMI 16.6 
-- Other - I will add my own diagnosis -- Disagree - Not applicable / Not valid -- Disagree - Clinically unable to determine / Unknown 
-- Refer to Clinical Documentation Reviewer PROVIDER RESPONSE TEXT: 
 
This patient has mild protein calorie malnutrition.  
 
Query created by: Kaila Henry on 2020 11:55 AM 
 
 
Electronically signed by:  Isaura Payne MD 2020 2:59 PM 
 
 
 
 
 none

## 2020-12-14 NOTE — PROGRESS NOTES
Progress Note 12/14/2020 3:07 PM 
NAME: Klaus Randolph MRN:  [id number] Admit Diagnosis: NSTEMI (non-ST elevated myocardial infarction) ([facility] [I21.4] Acute renal failure (ARF) (HCC) [N17.9] Subjective:  
Chart reviewed. Discussed with  the nurse. Discussed with the nephrologist.  Patient denies any chest pain. Denies any epigastric discomfort. Has a serious drop of hemoglobin and nurse mentions that oozing blood from insertion site for the dialysis catheter has stopped. Patient is going to get a dialysis today. Review of Systems: 
 
Symptom Y/N Comments  Symptom Y/N Comments Fever/Chills n   Chest Pain n   
Poor Appetite    Edema Cough n   Abdominal Pain n   
Sputum    Joint Pain SOB/STERN n   Pruritis/Rash Nausea/vomit n   Other Diarrhea Constipation Could NOT obtain due to:   
 
Objective:  
  
 
 
Physical Exam: 
 
Last 24hrs VS reviewed since prior progress note. Most recent are: 
 
Visit Vitals /66 Pulse 76 Temp 98 °F (36.7 °C) Resp 12 Ht 5' 10\" (1.778 m) Wt 52.6 kg (115 lb 15.4 oz) SpO2 99% BMI 16.64 kg/m² Intake/Output Summary (Last 24 hours) at 12/14/2020 1152 Last data filed at 12/14/2020 0700 Gross per 24 hour Intake 1151.81 ml Output 150 ml Net 1001.81 ml General Appearance: Well developed, well nourished, alert & oriented x 3,  
 no acute distress. Ears/Nose/Mouth/Throat: Hearing grossly normal. 
Neck: Supple. Chest: Lungs clear to auscultation bilaterally. Cardiovascular: Regular rate and rhythm, S1,S2 normal, no murmur. Abdomen: Soft, non-tender, bowel sounds are active. Extremities: No edema bilaterally. Skin: Warm and dry. []         Post-cath site without hematoma, bruit, tenderness, or thrill. Distal pulses intact. PMH/SH reviewed - no change compared to H&P Data Review Telemetry: normal sinus rhythm EKG:  
[x]   Sinus rhythm right bundle branch block, ST depression seems to have improved to some extent. Lab Data Personally Reviewed: 
 
Recent Labs 12/14/20 
0445 12/13/20 
0515 WBC 12.5* 12.8* HGB 8.7* 8.5* HCT 25.6* 25.0*  
 207 Recent Labs 12/13/20 
1845 12/13/20 
0950 12/12/20 
2110 APTT 87.4* 94.1* 43.8* Recent Labs 12/14/20 
0445 12/13/20 
0515 12/12/20 
0500  139 138  
K 3.2* 3.6 3.6  103 103 CO2 20* 22 23 BUN 61* 56* 50* CREA 9.88* 9.42* 8.57* GLU 94 95 89  
CA 8.0* 8.2* 8.2* Recent Labs 12/14/20 
0445 12/12/20 
0500 12/11/20 
1215 TROIQ 25.30* 59.60* 72.40* No results found for: CHOL, CHOLX, CHLST, CHOLV, HDL, HDLP, LDL, LDLC, DLDLP, TGLX, TRIGL, TRIGP, CHHD, CHHDX Recent Labs 12/14/20 
0445 12/13/20 
0515 12/12/20 
0500 AP 97 244* 81  
TP 5.5* 5.6* 5.9* ALB 1.7* 2.0* 2.2*  
GLOB 3.8 3.6 3.7 No results for input(s): PH, PCO2, PO2 in the last 72 hours. Medications Personally Reviewed: 
 
Current Facility-Administered Medications Medication Dose Route Frequency  0.9% sodium chloride infusion 250 mL  250 mL IntraVENous PRN  thiamine mononitrate (B-1) tablet 100 mg  100 mg Oral DAILY  folic acid (FOLVITE) tablet 1 mg  1 mg Oral DAILY  multivitamin (ONE A DAY) tablet 1 Tab  1 Tab Oral DAILY  piperacillin-tazobactam (ZOSYN) 3.375 g in 0.9% sodium chloride (MBP/ADV) 100 mL MBP  3.375 g IntraVENous Q12H  sevelamer carbonate (RENVELA) tab 1,600 mg  1,600 mg Oral TID WITH MEALS  
 heparin (porcine) 1,000 unit/mL injection 2,500 Units  2,500 Units Hemodialysis PRN  
 atorvastatin (LIPITOR) tablet 40 mg  40 mg Oral QHS  heparin 25,000 units in D5W 250 ml infusion  12-25 Units/kg/hr IntraVENous TITRATE  heparin (porcine) 1,000 unit/mL injection 3,400 Units  60 Units/kg IntraVENous PRN  Or  
 heparin (porcine) 1,000 unit/mL injection 1,700 Units  30 Units/kg IntraVENous PRN  
 nitroglycerin (NITROSTAT) tablet 0.4 mg  0.4 mg SubLINGual PRN  
 aspirin delayed-release tablet 81 mg  81 mg Oral DAILY  clopidogreL (PLAVIX) tablet 75 mg  75 mg Oral DAILY  metoprolol tartrate (LOPRESSOR) tablet 25 mg  25 mg Oral BID  pantoprazole (PROTONIX) tablet 40 mg  40 mg Oral DAILY  sodium chloride (NS) flush 5-40 mL  5-40 mL IntraVENous Q8H  
 sodium chloride (NS) flush 5-40 mL  5-40 mL IntraVENous PRN  polyethylene glycol (MIRALAX) packet 17 g  17 g Oral DAILY PRN  promethazine (PHENERGAN) tablet 12.5 mg  12.5 mg Oral Q6H PRN Or  
 ondansetron (ZOFRAN) injection 4 mg  4 mg IntraVENous Q6H PRN  
 albuterol CONCENTRATE 2.5mg/0.5 mL neb soln  1.25 mg Nebulization Q4H PRN  
 0.9% sodium chloride infusion  50 mL/hr IntraVENous CONTINUOUS  
 nicotine (NICODERM CQ) 14 mg/24 hr patch 1 Patch  1 Patch TransDERmal DAILY Problem List:  
Non-Q wave myocardial infarction. Patient has a serious drop of hemoglobin and it could be from puncture of subclavian for dialysis catheter but he could have a GI bleed also. Acute renal failure and patient will have dialysis today. Plavix has been started and hemoglobin has not dropped. 1.   
 
Assessment/Plan:  
Because of her acute renal failure and serious drop of hemoglobin I will probably not rush him to cardiac Cath Lab until his hemoglobin is somewhat stable. He does not have any chest pain. I will give him potassium chloride 40 mEq as his potassium level is low. We will consider cardiac catheterization and possible angioplasty stent insertion tomorrow morning. Discussed with the nurse and with the patient and with the nephrologist.  Explained the procedure and risk benefits for catheterization and possible angioplasty stent insertion to the patient. Patient understands and agrees. I answered him all the questions in this regard. Thank you. 1.   
 
  
 [x]       High complexity decision making was performed in this patient at high risk for decompensation with multiple organ involvement.  
 
Kavita Flanagan MD

## 2020-12-14 NOTE — PROGRESS NOTES
12/14/2020 11:31 AM 
 
Admit Date: 12/10/2020 Admit Diagnosis: NSTEMI (non-ST elevated myocardial infarction) ([facility] [I21.4] Acute renal failure (ARF) (HCC) [N17.9] Subjective:  
Patient was seen and examined. Does not complain of pain. Resting in bed No nausea or vomiting. Review of Systems -negative Objective:  
  
Visit Vitals /66 Pulse 76 Temp 98 °F (36.7 °C) Resp 12 Ht 5' 10\" (1.778 m) Wt 52.6 kg (115 lb 15.4 oz) SpO2 99% BMI 16.64 kg/m² Physical Exam: 
General- thin  [place] male is no distress. Neck- supple, no JVD 
CV- regular rate and rhythm no MRG Lung- clear bilaterally Abd- soft, nontender, nondistended Ext- no edema bilaterally. Skin- warm and dry Access- R iJ temp cvc Recent Results (from the past 24 hour(s)) PTT Collection Time: 12/13/20  6:45 PM  
Result Value Ref Range aPTT 87.4 (H) 23.0 - 35.7 sec  
 aPTT, therapeutic range   68 - 109 sec TROPONIN I Collection Time: 12/14/20  4:45 AM  
Result Value Ref Range Troponin-I, Qt. 25.30 (H) <0.05 ng/mL CBC WITH AUTOMATED DIFF Collection Time: 12/14/20  4:45 AM  
Result Value Ref Range WBC 12.5 (H) 4.1 - 11.1 K/uL  
 RBC 2.69 (L) 4.10 - 5.70 M/uL HGB 8.7 (L) 12.1 - 17.0 g/dL HCT 25.6 (L) 36.6 - 50.3 % MCV 95.2 80.0 - 99.0 FL  
 MCH 32.3 26.0 - 34.0 PG  
 MCHC 34.0 30.0 - 36.5 g/dL  
 RDW 15.0 (H) 11.5 - 14.5 % PLATELET 133 425 - 422 K/uL MPV 9.9 8.9 - 12.9 FL  
 NEUTROPHILS 85 (H) 32 - 75 % LYMPHOCYTES 9 (L) 12 - 49 % MONOCYTES 5 5 - 13 % EOSINOPHILS 1 0 - 7 % BASOPHILS 0 0 - 1 % IMMATURE GRANULOCYTES 0 0.0 - 0.5 % ABS. NEUTROPHILS 10.6 (H) 1.8 - 8.0 K/UL  
 ABS. LYMPHOCYTES 1.2 0.8 - 3.5 K/UL  
 ABS. MONOCYTES 0.6 0.0 - 1.0 K/UL  
 ABS. EOSINOPHILS 0.1 0.0 - 0.4 K/UL  
 ABS. BASOPHILS 0.0 0.0 - 0.1 K/UL  
 ABS. IMM. GRANS. 0.1 (H) 0.00 - 0.04 K/UL  
 DF AUTOMATED METABOLIC PANEL, COMPREHENSIVE  Collection Time: 12/14/20  4:45 AM Result Value Ref Range Sodium 138 136 - 145 mmol/L Potassium 3.2 (L) 3.5 - 5.1 mmol/L Chloride 104 97 - 108 mmol/L  
 CO2 20 (L) 21 - 32 mmol/L Anion gap 14 5 - 15 mmol/L Glucose 94 65 - 100 mg/dL BUN 61 (H) 6 - 20 mg/dL Creatinine 9.88 (H) 0.70 - 1.30 mg/dL BUN/Creatinine ratio 6 (L) 12 - 20 GFR est AA 6 (L) >60 ml/min/1.73m2 GFR est non-AA 5 (L) >60 ml/min/1.73m2 Calcium 8.0 (L) 8.5 - 10.1 mg/dL Bilirubin, total 0.3 0.2 - 1.0 mg/dL AST (SGOT) 654 (H) 15 - 37 U/L  
 ALT (SGPT) 355 (H) 12 - 78 U/L Alk. phosphatase 97 45 - 117 U/L Protein, total 5.5 (L) 6.4 - 8.2 g/dL Albumin 1.7 (L) 3.5 - 5.0 g/dL Globulin 3.8 2.0 - 4.0 g/dL A-G Ratio 0.4 (L) 1.1 - 2.2 Intake/Output Summary (Last 24 hours) at 12/14/2020 1131 Last data filed at 12/14/2020 0700 Gross per 24 hour Intake 1151.81 ml Output 150 ml Net 1001.81 ml Data Review:  
Recent Labs 12/14/20 
0445   
K 3.2*  
BUN 61* CREA 9.88* WBC 12.5* HGB 8.7* HCT 25.6*   
 
[unfilled] Assessment/Plan: Active Problems: 
  NSTEMI (non-ST elevated myocardial infarction) ([facility] (12/10/2020) Acute renal failure (ARF) ([facility] (12/10/2020) Alcohol abuse (12/11/2020) Abdominal pain (12/11/2020) Hyperkalemia (12/11/2020) Sepsis ([facility] (12/11/2020) Anemia associated with acute blood loss (12/12/2020) DIAGNOSES 1. NSTEMI 2. Acute renal failure requiring dialysis 3. Anemia, chronic 4. History of alcohol abuse 5. Sepsis on Zosyn DISCUSSION AND PLAN 
 Troponin is  elevated and  plans to take patient for cardiac catheterization tomorrow  We will dialyze him today.  We will also dialyze tomorrow after cardiac cath function.  Currently on IV fluidswe will not remove any fluid on dialysis.  We will dialyze on a 4K bath. This dictation was done by dragon, computer voice recognition software.   Often unanticipated grammatical, syntax, phones and other interpretive errors are inadvertently transcribed. Please excuse errors that have escaped final proofreading.

## 2020-12-14 NOTE — PROGRESS NOTES
Hospitalist Progress Note Daily Progress Note: 12/14/2020 Subjective: The patient is seen for follow  up.  
43-year-old male was admitted last night with a complaint of abdominal pain in the epigastric area and lower chest pain. Patient was found to have mildly elevated troponins on admission which has increased to 72.4 now. Cardiology has recommended to restart the patient on heparin drip. Patient denies any chest pain at this time. Patient was also found to have acute renal failure and has been started on hemodialysis. Patient also endorses significant alcohol abuse. Patient had elevation in troponins and was started on heparin drip. He was noted to have oozing of blood from dialysis catheter site. This was pressure dressed. H&H drop also noticed. Patient received blood transfusion following this H&H has been stable. 12/14/2020: Plan for dialysis and cath today Medications reviewed Current Facility-Administered Medications Medication Dose Route Frequency  0.9% sodium chloride infusion 250 mL  250 mL IntraVENous PRN  thiamine mononitrate (B-1) tablet 100 mg  100 mg Oral DAILY  folic acid (FOLVITE) tablet 1 mg  1 mg Oral DAILY  multivitamin (ONE A DAY) tablet 1 Tab  1 Tab Oral DAILY  piperacillin-tazobactam (ZOSYN) 3.375 g in 0.9% sodium chloride (MBP/ADV) 100 mL MBP  3.375 g IntraVENous Q12H  sevelamer carbonate (RENVELA) tab 1,600 mg  1,600 mg Oral TID WITH MEALS  sodium bicarbonate tablet 1,300 mg  1,300 mg Oral TID  heparin (porcine) 1,000 unit/mL injection 2,500 Units  2,500 Units Hemodialysis PRN  
 atorvastatin (LIPITOR) tablet 40 mg  40 mg Oral QHS  heparin 25,000 units in D5W 250 ml infusion  12-25 Units/kg/hr IntraVENous TITRATE  heparin (porcine) 1,000 unit/mL injection 3,400 Units  60 Units/kg IntraVENous PRN  Or  
 heparin (porcine) 1,000 unit/mL injection 1,700 Units  30 Units/kg IntraVENous PRN  
 nitroglycerin (NITROSTAT) tablet 0.4 mg  0.4 mg SubLINGual PRN  
 aspirin delayed-release tablet 81 mg  81 mg Oral DAILY  clopidogreL (PLAVIX) tablet 75 mg  75 mg Oral DAILY  metoprolol tartrate (LOPRESSOR) tablet 25 mg  25 mg Oral BID  pantoprazole (PROTONIX) tablet 40 mg  40 mg Oral DAILY  sodium chloride (NS) flush 5-40 mL  5-40 mL IntraVENous Q8H  
 sodium chloride (NS) flush 5-40 mL  5-40 mL IntraVENous PRN  polyethylene glycol (MIRALAX) packet 17 g  17 g Oral DAILY PRN  promethazine (PHENERGAN) tablet 12.5 mg  12.5 mg Oral Q6H PRN Or  
 ondansetron (ZOFRAN) injection 4 mg  4 mg IntraVENous Q6H PRN  
 albuterol CONCENTRATE 2.5mg/0.5 mL neb soln  1.25 mg Nebulization Q4H PRN  
 0.9% sodium chloride infusion  125 mL/hr IntraVENous CONTINUOUS  
 nicotine (NICODERM CQ) 14 mg/24 hr patch 1 Patch  1 Patch TransDERmal DAILY Review of Systems: A comprehensive review of systems was negative except for that written in the HPI. Objective:  
Physical Exam:  
 
Visit Vitals /66 Pulse 92 Temp 98.5 °F (36.9 °C) Resp 12 Ht 5' 10\" (1.778 m) Wt 52.6 kg (115 lb 15.4 oz) SpO2 95% BMI 16.64 kg/m² O2 Device: Room air Temp (24hrs), Av.3 °F (36.8 °C), Min:98 °F (36.7 °C), Max:98.7 °F (37.1 °C) No intake/output data recorded.  1901 -  0700 In: 1379.3 [I.V.:1141.8] Out: 150 [Urine:150] PHYSICAL EXAM: 
General: alert and awake, not in distress Skin: Extremities and face reveal no rashes. HEENT: Sclerae anicteric. Extra-occular muscles are intact. No oral ulcers. No ENT discharge. The neck is supple. Pressure dressing of dialysis catheter noticed. No active bleeding noticed. .  
Cardiovascular: Regular rate and rhythm. No murmurs, gallops, or rubs. PMI nondisplaced. Carotids without bruits. Respiratory: Comfortable breathing with no accessory muscle use. Clear breath sounds with no wheezes, rales, or rhonchi. GI: Abdomen nondistended, soft, and nontender.  Normal active bowel sounds. No enlargement of the liver or spleen. No masses palpable. Rectal: Deferred Musculoskeletal: No pitting edema of the lower legs. Extremities have good range of motion. No costovertebral tenderness. Neurological: Gross memory appears intact. Patient is alert and oriented. Power 5/5, Cranial nerves II-XII intact Psychiatric: Mood appears appropriate with judgement intact. Lymphatic: No cervical or supraclavicular adenopathy. Data Review:  
   
Recent Days: 
Recent Labs 12/14/20 0445 12/13/20 
0515 12/12/20 
2110 WBC 12.5* 12.8* 11.3* HGB 8.7* 8.5* 8.5* HCT 25.6* 25.0* 24.8*  
 207 193 Recent Labs 12/14/20 0445 12/13/20 
0515 12/12/20 
0500  139 138  
K 3.2* 3.6 3.6  103 103 CO2 20* 22 23 GLU 94 95 89 BUN 61* 56* 50* CREA 9.88* 9.42* 8.57* CA 8.0* 8.2* 8.2* ALB 1.7* 2.0* 2.2* TBILI 0.3 0.3 0.3 * 347* 334* No results for input(s): PH, PCO2, PO2, HCO3, FIO2 in the last 72 hours. CMP:  
Lab Results Component Value Date/Time Glucose 94 12/14/2020 04:45 AM  
 Sodium 138 12/14/2020 04:45 AM  
 Potassium 3.2 (L) 12/14/2020 04:45 AM  
 Chloride 104 12/14/2020 04:45 AM  
 CO2 20 (L) 12/14/2020 04:45 AM  
 BUN 61 (H) 12/14/2020 04:45 AM  
 Creatinine 9.88 (H) 12/14/2020 04:45 AM  
 Calcium 8.0 (L) 12/14/2020 04:45 AM  
 Anion gap 14 12/14/2020 04:45 AM  
 BUN/Creatinine ratio 6 (L) 12/14/2020 04:45 AM  
 Alk. phosphatase 97 12/14/2020 04:45 AM  
 Protein, total 5.5 (L) 12/14/2020 04:45 AM  
 Albumin 1.7 (L) 12/14/2020 04:45 AM  
 Globulin 3.8 12/14/2020 04:45 AM  
 A-G Ratio 0.4 (L) 12/14/2020 04:45 AM  
 
Cardiac markers:  
Lab Results Component Value Date/Time CK Hemolyzed, recollect requested 12/10/2020 09:11 PM  
 
Radiology review:  
 
 
XR CHEST PORT Final Result IMPRESSION: No evidence of pneumothorax or pleural fluid collection. US ABD COMP Final Result Impression: No ultrasound evidence for infectious/inflammatory process involving  
the gallbladder. XR CHEST PORT Final Result Impression:   
Successful placement of a nontunneled hemodialysis catheter. The catheter is  
ready for use. IR INSERT NON TUNL CVC OVER 5 YRS Final Result Impression:   
Successful placement of a nontunneled hemodialysis catheter. The catheter is  
ready for use. IR [facility] Final Result Impression:   
Successful placement of a nontunneled hemodialysis catheter. The catheter is  
ready for use. CT ABD PELV WO CONT Final Result IMPRESSION:  
1. No hydronephrosis. 2. Small nonobstructing left renal upper pole sinus calculus. No ureter  
calculus. 3. Markedly distended gallbladder without other findings potentially reflecting  
cholecystitis. 4. Atherosclerosis. 5. Peripheral ill-defined and nodular densities in the right lower lobe, could  
reflect acute inflammatory disease or infection among other possibilities. XR CHEST PORT Final Result US RETROPERITONEUM COMP    (Results Pending) US ABD LTD    (Results Pending) Assessment/  
 
Problem List: 
Hospital Problems  Never Reviewed Codes Class Noted POA Anemia associated with acute blood loss ICD-10-CM: D62 
ICD-9-CM: 285.1  12/12/2020 Unknown Alcohol abuse ICD-10-CM: F10.10 ICD-9-CM: 305.00  12/11/2020 Unknown Abdominal pain ICD-10-CM: R10.9 ICD-9-CM: 789.00  12/11/2020 Unknown Hyperkalemia ICD-10-CM: E87.5 ICD-9-CM: 276.7  12/11/2020 Yes Sepsis ([facility] ICD-10-CM: A41.9 ICD-9-CM: 038.9, 995.91  12/11/2020 Yes NSTEMI (non-ST elevated myocardial infarction) ([facility] ICD-10-CM: I21.4 ICD-9-CM: 410.70  12/10/2020 Unknown Acute renal failure (ARF) (HCC) ICD-10-CM: N17.9 ICD-9-CM: 584.9  12/10/2020 Yes Plan: 1. NSTEMI: Continue the patient on heparin drip and aspirin and Lipitor.   With the cardiology patient was started back on Plavix loading dose was given yesterday. We will resume 75 mg of Plavix daily today. Plan for cardiac catheterization today after hemodialysis. Continue patient on Lopressor. Cardiology evaluation of the patient will be appreciated 2. Anemia, acute blood lose- Pressure dressing of the dialysis catheter- will repeat H&H. Transfused one unit. H&H stable. 3.  Acute renal failure with hyperkalemia:follow nephrology recommendations. 3.  Abdominal pain with dilated gallbladder on CT scan. USG abd does not show signs of cholecystitis. Follow with surgery. Gall Bladder distension likely from poor oral intake. 4.  Alcohol abuse: We will continue the patient on thiamine and multivitamins. Watch for any withdrawal symptoms. 5.  Sepsis: Patient has significantly elevated lactic acid level. Suspicious for sepsis. Patient is empirically started on IV antibiotics. We will follow-up on patient's blood cultures and urine culture. 7. Metabolic acidosis- due to renal failure- improved. Care Plan discussed with: Patient/Family,  and Consultant surgery.  
 
Ada Mcgrath MD

## 2020-12-15 NOTE — PROGRESS NOTES
Hospitalist Progress Note Daily Progress Note: 12/15/2020 Subjective: The patient is seen for follow  up.  
77-year-old male was admitted last night with a complaint of abdominal pain in the epigastric area and lower chest pain. Patient was found to have mildly elevated troponins on admission which has increased to 72.4 now. Cardiology has recommended to restart the patient on heparin drip. Patient denies any chest pain at this time. Patient was also found to have acute renal failure and has been started on hemodialysis. Patient also endorses significant alcohol abuse. Patient had elevation in troponins and was started on heparin drip. He was noted to have oozing of blood from dialysis catheter site. This was pressure dressed. H&H drop also noticed. Patient received blood transfusion following this H&H has been stable. 12/15/2020: Patient seen and evaluated at bedside, patient currently has no active complaints, of note patient is scheduled for cardiac catheterization followed by hemodialysis later today, discussed with RN events at bedside Medications reviewed Current Facility-Administered Medications Medication Dose Route Frequency  0.9% sodium chloride infusion 250 mL  250 mL IntraVENous PRN  
 0.9% sodium chloride infusion 250 mL  250 mL IntraVENous PRN  thiamine mononitrate (B-1) tablet 100 mg  100 mg Oral DAILY  folic acid (FOLVITE) tablet 1 mg  1 mg Oral DAILY  multivitamin (ONE A DAY) tablet 1 Tab  1 Tab Oral DAILY  piperacillin-tazobactam (ZOSYN) 3.375 g in 0.9% sodium chloride (MBP/ADV) 100 mL MBP  3.375 g IntraVENous Q12H  sevelamer carbonate (RENVELA) tab 1,600 mg  1,600 mg Oral TID WITH MEALS  
 heparin (porcine) 1,000 unit/mL injection 2,500 Units  2,500 Units Hemodialysis PRN  
 atorvastatin (LIPITOR) tablet 40 mg  40 mg Oral QHS  heparin 25,000 units in D5W 250 ml infusion  12-25 Units/kg/hr IntraVENous TITRATE  heparin (porcine) 1,000 unit/mL injection 3,400 Units  60 Units/kg IntraVENous PRN Or  
 heparin (porcine) 1,000 unit/mL injection 1,700 Units  30 Units/kg IntraVENous PRN  
 nitroglycerin (NITROSTAT) tablet 0.4 mg  0.4 mg SubLINGual PRN  
 aspirin delayed-release tablet 81 mg  81 mg Oral DAILY  clopidogreL (PLAVIX) tablet 75 mg  75 mg Oral DAILY  metoprolol tartrate (LOPRESSOR) tablet 25 mg  25 mg Oral BID  pantoprazole (PROTONIX) tablet 40 mg  40 mg Oral DAILY  sodium chloride (NS) flush 5-40 mL  5-40 mL IntraVENous Q8H  
 sodium chloride (NS) flush 5-40 mL  5-40 mL IntraVENous PRN  polyethylene glycol (MIRALAX) packet 17 g  17 g Oral DAILY PRN  promethazine (PHENERGAN) tablet 12.5 mg  12.5 mg Oral Q6H PRN Or  
 ondansetron (ZOFRAN) injection 4 mg  4 mg IntraVENous Q6H PRN  
 albuterol CONCENTRATE 2.5mg/0.5 mL neb soln  1.25 mg Nebulization Q4H PRN  
 0.9% sodium chloride infusion  50 mL/hr IntraVENous CONTINUOUS  
 nicotine (NICODERM CQ) 14 mg/24 hr patch 1 Patch  1 Patch TransDERmal DAILY Review of Systems: A comprehensive review of systems was negative except for that written in the HPI. Objective:  
Physical Exam:  
 
Visit Vitals /64 Pulse 100 Temp 98.9 °F (37.2 °C) Resp 15 Ht 5' 10\" (1.778 m) Wt 52.6 kg (115 lb 15.4 oz) SpO2 98% BMI 16.64 kg/m² O2 Device: Room air Temp (24hrs), Av.5 °F (36.9 °C), Min:98 °F (36.7 °C), Max:99.3 °F (37.4 °C) No intake/output data recorded. 1901 - 12/15 0700 In: 1331.6 [I.V.:1321.6] Out: 100 [Urine:150] PHYSICAL EXAM: 
General: alert and awake, not in distress Skin: Extremities and face reveal no rashes. HEENT: Sclerae anicteric. Extra-occular muscles are intact. No oral ulcers. No ENT discharge. The neck is supple. Pressure dressing of dialysis catheter noticed. No active bleeding noticed. .  
Cardiovascular: Regular rate and rhythm. No murmurs, gallops, or rubs. PMI nondisplaced. Carotids without bruits. Respiratory: Comfortable breathing with no accessory muscle use. Clear breath sounds with no wheezes, rales, or rhonchi. GI: Abdomen nondistended, soft, and nontender. Normal active bowel sounds. No enlargement of the liver or spleen. No masses palpable. Rectal: Deferred Musculoskeletal: No pitting edema of the lower legs. Extremities have good range of motion. No costovertebral tenderness. Neurological: Gross memory appears intact. Patient is alert and oriented. Power 5/5, Cranial nerves II-XII intact Psychiatric: Mood appears appropriate with judgement intact. Lymphatic: No cervical or supraclavicular adenopathy. Data Review:  
   
Recent Days: 
Recent Labs 12/15/20 
2574 12/14/20 
0445 12/13/20 
0515 WBC 12.2* 12.5* 12.8* HGB 8.1* 8.7* 8.5* HCT 23.6* 25.6* 25.0*  
 211 207 Recent Labs 12/15/20 
3671 12/14/20 
0445 12/13/20 
0515   137 138 139  
K 3.3*  3.3* 3.2* 3.6   103 104 103 CO2 24  25 20* 22  
GLU 94  96 94 95 BUN 30*  30* 61* 56* CREA 5.82*  5.85* 9.88* 9.42* CA 8.0*  8.0* 8.0* 8.2* PHOS 3.4  --   --   
ALB 1.7*  1.7* 1.7* 2.0*  
TBILI 0.3 0.3 0.3 * 355* 347* No results for input(s): PH, PCO2, PO2, HCO3, FIO2 in the last 72 hours. CMP:  
Lab Results Component Value Date/Time  Glucose 94 12/15/2020 03:55 AM  
 Glucose 96 12/15/2020 03:55 AM  
 Sodium 138 12/15/2020 03:55 AM  
 Sodium 137 12/15/2020 03:55 AM  
 Potassium 3.3 (L) 12/15/2020 03:55 AM  
 Potassium 3.3 (L) 12/15/2020 03:55 AM  
 Chloride 103 12/15/2020 03:55 AM  
 Chloride 103 12/15/2020 03:55 AM  
 CO2 24 12/15/2020 03:55 AM  
 CO2 25 12/15/2020 03:55 AM  
 BUN 30 (H) 12/15/2020 03:55 AM  
 BUN 30 (H) 12/15/2020 03:55 AM  
 Creatinine 5.82 (H) 12/15/2020 03:55 AM  
 Creatinine 5.85 (H) 12/15/2020 03:55 AM  
 Calcium 8.0 (L) 12/15/2020 03:55 AM  
 Calcium 8.0 (L) 12/15/2020 03:55 AM  
 Anion gap 11 12/15/2020 03:55 AM  
 Anion gap 9 12/15/2020 03:55 AM BUN/Creatinine ratio 5 (L) 12/15/2020 03:55 AM  
 BUN/Creatinine ratio 5 (L) 12/15/2020 03:55 AM  
 Alk. phosphatase 93 12/15/2020 03:55 AM  
 Protein, total 5.5 (L) 12/15/2020 03:55 AM  
 Albumin 1.7 (L) 12/15/2020 03:55 AM  
 Albumin 1.7 (L) 12/15/2020 03:55 AM  
 Globulin 3.8 12/15/2020 03:55 AM  
 A-G Ratio 0.4 (L) 12/15/2020 03:55 AM  
 
Cardiac markers:  
Lab Results Component Value Date/Time CK Hemolyzed, recollect requested 12/10/2020 09:11 PM  
 
Radiology review:  
 
 
XR CHEST PORT Final Result IMPRESSION: No evidence of pneumothorax or pleural fluid collection. US ABD COMP Final Result Impression: No ultrasound evidence for infectious/inflammatory process involving  
the gallbladder. XR CHEST PORT Final Result Impression:   
Successful placement of a nontunneled hemodialysis catheter. The catheter is  
ready for use. IR INSERT NON TUNL CVC OVER 5 YRS Final Result Impression:   
Successful placement of a nontunneled hemodialysis catheter. The catheter is  
ready for use. IR [facility] Final Result Impression:   
Successful placement of a nontunneled hemodialysis catheter. The catheter is  
ready for use. CT ABD PELV WO CONT Final Result IMPRESSION:  
1. No hydronephrosis. 2. Small nonobstructing left renal upper pole sinus calculus. No ureter  
calculus. 3. Markedly distended gallbladder without other findings potentially reflecting  
cholecystitis. 4. Atherosclerosis. 5. Peripheral ill-defined and nodular densities in the right lower lobe, could  
reflect acute inflammatory disease or infection among other possibilities. XR CHEST PORT Final Result US RETROPERITONEUM COMP    (Results Pending) US ABD LTD    (Results Pending) XR CHEST PORT    (Results Pending) Assessment/  
 
Problem List: 
Hospital Problems  Never Reviewed Codes Class Noted POA  Anemia associated with acute blood loss ICD-10-CM: D62 
ICD-9-CM: 285.1  12/12/2020 Unknown Alcohol abuse ICD-10-CM: F10.10 ICD-9-CM: 305.00  12/11/2020 Unknown Abdominal pain ICD-10-CM: R10.9 ICD-9-CM: 789.00  12/11/2020 Unknown Hyperkalemia ICD-10-CM: E87.5 ICD-9-CM: 276.7  12/11/2020 Yes Sepsis ([facility] [address] ICD-10-CM: A41.9 ICD-9-CM: 038.9, 995.91  12/11/2020 Yes NSTEMI (non-ST elevated myocardial infarction) ([facility] [address] ICD-10-CM: I21.4 ICD-9-CM: 410.70  12/10/2020 Unknown Acute renal failure (ARF) ([facility]) ICD-10-CM: N17.9 ICD-9-CM: 584.9  12/10/2020 Yes Plan: 
 
Non-ST elevation MIpatient was found to have a significant elevation of serum troponin and based on patient's clinical presentation consistent with a non-ST elevation MI, patient remains hemodynamically stable at this time Continue heparin GTT Continue aspirin as well as Plavix Continue Lipitor Continue Lopressor Patient's scheduled for cardiac catheterization later today Cardiology consult appreciated, will continue to follow recommendations Acute kidney injurypatient was found to have acute kidney injury, likely multifactorial in nature, patient was initiated on hemodialysis on this hospitalization Continue hemodialysis as per nephrology recommendations, patient will need hemodialysis later today following cardiac catheterization Continue to trend serum creatinine Nephrology recommendations appreciated, will continue to follow Hypokalemiareplete potassium recheck potassium Anemiaplease secondary to blood loss from dialysis catheter site, currently dressing appears to be clean, hemoglobin hematocrit remained stable Continue to maintain active type and screen Continue to trend hemoglobin and hematocrit Transfuse for hemoglobin of less than 8 Sepsispatient was found to have met sepsis criteria upon initial presentation with evidence of lactic acidosis, unclear as to underlying source Continue to monitor Continue Zosyn for empiric antibiotic coverage Abdominal pain with dilated gallbladderpatient presented with complaints of abdominal pain with evidence of gallbladder distention Continue symptomatic relief General surgery was consulted, no indications for surgical intervention at this time History of alcohol abusecontinue thiamine multivitamin and folate Continue frequent CIWA checks ProphylaxisHeparin GTT FENrenal diet, replete potassium and magnesium Full code, will clarify about surrogate decision-maker Dispositionpending clinical improvement, possible transfer out of ICU later today depending on cardiac catheterization findings Critical care time spent 70 minutes involving direct patient care as well as reviewing patient's labs and coordination of care with nursing staff Care Plan discussed with: Patient/Family,  and Consultant surgery.  
 
Elizabeth Brooks MD

## 2020-12-15 NOTE — PROGRESS NOTES
Patient left for the cath lab accompanied by cath lab staff. Patient is alert, oriented, and stable at this time, he stated that he is in no pain at this time.

## 2020-12-15 NOTE — PROGRESS NOTES
Spiritual Care Assessment/Progress Note [address] 
 
 
NAME: Elieser Tavera      MRN: [id number] AGE: 76 y.o. SEX: male Sikhism Affiliation: No preference Language: English  
 
12/15/2020     Total Time (in minutes): 20 Spiritual Assessment begun in [facility] ICU through conversation with: 
  
    [x]Patient        [x] Family    [] Friend(s) Reason for Consult: Family care Spiritual beliefs: (Please include comment if needed) [x] Identifies with a sven tradition:    Chani Walker 
   [] Supported by a sven community:        
   [] Claims no spiritual orientation:       
   [] Seeking spiritual identity:            
   [] Adheres to an individual form of spirituality:       
   [] Not able to assess:                   
 
    
Identified resources for coping:  
   [x] Prayer                           
   [] Music                  [] Guided Imagery 
   [] Family/friends                 [] Pet visits [] Devotional reading                         [] Unknown 
   [] Other:                                          
 
 
Interventions offered during this visit: (See comments for more details) Patient Interventions: Prayer (actual) Family/Friend(s): Affirmation of emotions/emotional suffering, Bridging, Normalization of emotional/spiritual concerns, Prayer (actual), Sikhism beliefs/image of God discussed Plan of Care: 
 
 [] Support spiritual and/or cultural needs  
 [] Support AMD and/or advance care planning process    
 [] Support grieving process 
 [] Coordinate Rites and/or Rituals  
 [] Coordination with community clergy  [] No spiritual needs identified at this time 
 [] Detailed Plan of Care below (See Comments)  [] Make referral to Music Therapy 
[] Make referral to Pet Therapy    
[] Make referral to Addiction services 
[] Make referral to [facility] 
[] Make referral to Spiritual Care Partner 
[] No future visits requested       
[x] Follow up upon further referrals Comments:  follow up visit upon referral from RN regarding family at bedside.  and family (3 of patient's siblings) reflected on patient's life as person of sven, life of the party, and a strong person.  listened reflectively and empathically as family expressed sadness blended with peace that patient had sven.  provided prayer of commendation for pt, for God's presence to be with family.  accompanied family towards exit.  will follow up upon further referrals.  
 
Visited by Facundo Babin, [facility]

## 2020-12-15 NOTE — DISCHARGE SUMMARY
79-year-old male with past medical history of multiple comorbidities presented to [facility] after having been found to have a non-ST elevation MI with significant elevation in serum troponin to 74, patient also found to have acute kidney injury, patient was initiated on hemodialysis, patient was found to have an anemia and transfused packed RBCs during the course of the admission, patient was subsequently stabilized to the point that patient could be taken to the cardiac Cath Lab, subsequent to which patient was found to have an occlusion as per cardiology attending notes, patient initially became hypoxic and was intubated following which patient became hypotensive and lost pulse, ACLS protocol was initiated for over 30 minutes following which patient , patient's family was subsequently notified

## 2020-12-15 NOTE — PROGRESS NOTES
Spiritual Care Assessment/Progress Note [address] [facility] 
 
 
NAME: Jose Kim      MRN: [id number] AGE: 76 y.o. SEX: male Anglican Affiliation: No preference Language: English  
 
12/15/2020     Total Time (in minutes): 15 Spiritual Assessment begun in [facility] ICU through conversation with: 
  
    [x]Patient        [] Family    [] Friend(s) Reason for Consult: Code Blue/Abrahan Spiritual beliefs: (Please include comment if needed) 
   [] Identifies with a sven tradition:     
   [] Supported by a sven community:        
   [] Claims no spiritual orientation:       
   [] Seeking spiritual identity:            
   [] Adheres to an individual form of spirituality:       
   [x] Not able to assess:                   
 
    
Identified resources for coping:  
   [] Prayer                           
   [] Music                  [] Guided Imagery 
   [] Family/friends                 [] Pet visits [] Devotional reading                         [x] Unknown 
   [] Other:                                       
 
 
Interventions offered during this visit: (See comments for more details) Patient Interventions: Crisis, Other (comment)(Code blue, Cath Lab) Plan of Care: 
 
 [] Support spiritual and/or cultural needs  
 [] Support AMD and/or advance care planning process    
 [] Support grieving process 
 [] Coordinate Rites and/or Rituals  
 [] Coordination with community clergy [] No spiritual needs identified at this time 
 [] Detailed Plan of Care below (See Comments)  [] Make referral to Music Therapy 
[] Make referral to Pet Therapy    
[] Make referral to Addiction services 
[] Make referral to [facility] 
[] Make referral to Spiritual Care Partner 
[] No future visits requested       
[x] Follow up upon further referrals Comments:  responded to Flory Boucher in [facility], there were no family members present.   consulted with IDT, will follow up upon further referrals.    
 
Visited by Conchita Babin, [facility]

## 2020-12-15 NOTE — ANESTHESIA PROCEDURE NOTES
Emergent Intubation Performed by: Lani Lisa CRNA Authorized by: Lani Lisa CRNA Emergent Intubation:  
Patient location: cath lab. Date/Time:  12/15/2020 10:20 AM 
Indications:  Impending respiratory failure Spontaneous Ventilation: present Level of Consciousness: unresponsive Preoxygenated: Yes Airway Documentation: Airway:  ETT - Cuffed Advanced Technique:  Peterson Insertion Site:  Oral 
Blade Size:  4 ETT size (mm):  7.0 ETT Line Rayray:  Lips ETT Insertion depth (cm):  21 Placement verified by: auscultation, EtCO2 and BBS Attempts:  1 Difficult airway: No

## 2020-12-15 NOTE — PROGRESS NOTES
Received call from the cath lab staff notifying me that the patient had coded and  while in the cath lab. Patient arrived back into room shortly after. Dr. Okasna Guthrie and myself attempted to call the patients sister, Maxene Osgood, but we did not get any answer. Nursing supervisor, Rambo Horton, made aware of patient death. [employer] called and notified, patient not suitable for donation due to sepsis. Primary MD, Dr. Alexus Song, made aware of time of death. [facility] called and made aware of death and he arrived to the unit shortly. Post mortem care provided by myself and Bibiana Wan RN. Patients belongings all together in room and sat aside for the patients family when they arrive. Patients family called back, let them know that we needed them to come to the hospital at this time. When family arrived, Dr. Alexus Song met with them in the ICU waiting area and then the family was walked back to patients room. Family currently in the patients room with [address].

## 2020-12-16 LAB
ACT BLD: 209 SEC (ref 74–125)
PERFORMED BY, TECHID: ABNORMAL

## 2020-12-17 LAB
BACTERIA SPEC CULT: NORMAL
SPECIAL REQUESTS,SREQ: NORMAL

## 2024-12-02 NOTE — DIALYSIS
Patient tolerated treatment. No fluid was removed. Patient was alert and oriented at the end of treatment. ,DirectAddress_Unknown

## (undated) DEVICE — HI-TORQUE WHISPER MS GUIDE WIRE .014 J TIP 3.0 CM X 190 CM: Brand: HI-TORQUE WHISPER

## (undated) DEVICE — PINNACLE INTRODUCER SHEATH: Brand: PINNACLE

## (undated) DEVICE — RUNTHROUGH NS EXTRA FLOPPY PTCA GUIDEWIRE: Brand: RUNTHROUGH

## (undated) DEVICE — SURGICAL PROCEDURE TRAY CRD CATH 3 PRT

## (undated) DEVICE — SYRINGE ANGIO 20ML WHT POLYCARB VAC PRSS CAP PLUNG FIX M

## (undated) DEVICE — TOOL INSRT ANGI GDWIRE MTL SS --

## (undated) DEVICE — GUIDEWIRE VASC L150CM DIA0.035IN FLX END L7CM J 3MM PTFE

## (undated) DEVICE — CATHETER ETER VASC OD6FR CARD 145DEG PGTL BRAID JL40 JR40 MP

## (undated) DEVICE — 6F .070 XB 3.5 100CM: Brand: VISTA BRITE TIP

## (undated) DEVICE — BOWL UTIL 16OZ STRL --

## (undated) DEVICE — SYRINGE MED 10ML RED POLYCARB BRL FIX M LUER CONN FLAT GRP

## (undated) DEVICE — PRESSURE TUBING: Brand: TRUWAVE

## (undated) DEVICE — VALVE HEMSTAS 9FR POLYCARB L BOR DBL Y ACCS +

## (undated) DEVICE — CATHETER GUID EXTRA BACKUP 3.5 0.070IN 6FR 100CM VISTA BRITE TIP

## (undated) DEVICE — DEVICE INFL SYR BLLN ENDO 30 -- INTELLI